# Patient Record
Sex: FEMALE | Race: WHITE | NOT HISPANIC OR LATINO | Employment: FULL TIME | URBAN - METROPOLITAN AREA
[De-identification: names, ages, dates, MRNs, and addresses within clinical notes are randomized per-mention and may not be internally consistent; named-entity substitution may affect disease eponyms.]

---

## 2023-05-18 NOTE — PROGRESS NOTES
Subjective   Ester Vogel is a 67 y.o. female who presents for follow-up.  HPI  Ester 67 with a known history of hypothyroid hypertension dyslipidemia here for follow-up fasting blood work voices no major medical pain denies chest pain shortness of breath fever chills nausea vomiting constipation diarrhea dysuria urgency frequency.  Review of Systems  10 system reviewed pertinent as above  Objective     Visit Vitals  /84   Pulse 72   Temp 36.1 °C (97 °F)   Resp 14      Physical Exam    HEENT: Atraumatic normocephalic the pupils are equal and round and reactive to light the sclerae nonicteric extraocular motion are intact.  Neck: Is supple without JVD no carotid bruits the trachea is midline there are no masses pulses are equal and bilateral with normal upstroke.  Skin: Normal.  Skin good texture.  Moist.  Good turgor.  No lesions, no rashes.  Lymph: No lymphadenopathy appreciated, no masses, no lesions  Lungs: Are clear to auscultation and percussion, good breath sounds bilaterally, no rhonchi, no wheezing, good diaphragmatic excursion.  Heart: Normal rate and normal rhythm S1, S2, no S3, no gallop, murmur or rub.  Abdomen: Soft, nontender, no organomegaly, good bowel sounds.    Extremities: Full range of motion, good pulses bilateral.  No cyanosis, no clubbing or edema.  Neuro: Cranial nerves II-XII are grossly intact there is no sensory or motor deficits.  Able to move all extremities.    Assessment/Plan     Patient is here for follow-up fasting blood work    CBC BMP lipids AST ALT vitamin D 25-hydroxy TSH    Continue with the low-fat, low-cholesterol diet,  I recommended Mediterranean diet, which include fish, chicken, vegetables and olive oil  Exercise daily for 30 minutes at least 3 times a week  Continue home medications    Hypertension  No added salt diet, do not and salt to your food  Try to exercise every other day for 30 minutes  Continue current medications    Hypothyroidism  Continue current  medications  Report any changes such as weight gain or weight loss  Continue to exercise regularly  Problem List Items Addressed This Visit          Circulatory    Heart palpitations - Primary    Relevant Orders    Holter or Event Cardiac Monitor    Basic Metabolic Panel       Endocrine/Metabolic    Hypothyroidism    Relevant Orders    CBC    Basic Metabolic Panel    TSH    Vitamin D deficiency    Relevant Orders    Vitamin D 25-Hydroxy,Total       Other    Dyslipidemia    Relevant Orders    CBC    Lipid Panel    AST    ALT    Tiredness    Relevant Orders    CBC         Tan Manzo MD

## 2023-05-23 ENCOUNTER — OFFICE VISIT (OUTPATIENT)
Dept: PRIMARY CARE | Facility: CLINIC | Age: 68
End: 2023-05-23
Payer: COMMERCIAL

## 2023-05-23 VITALS
SYSTOLIC BLOOD PRESSURE: 134 MMHG | HEIGHT: 66 IN | TEMPERATURE: 97 F | RESPIRATION RATE: 14 BRPM | HEART RATE: 72 BPM | WEIGHT: 190 LBS | DIASTOLIC BLOOD PRESSURE: 84 MMHG | BODY MASS INDEX: 30.53 KG/M2

## 2023-05-23 DIAGNOSIS — E78.5 DYSLIPIDEMIA: ICD-10-CM

## 2023-05-23 DIAGNOSIS — E55.9 VITAMIN D DEFICIENCY: ICD-10-CM

## 2023-05-23 DIAGNOSIS — R53.83 TIREDNESS: ICD-10-CM

## 2023-05-23 DIAGNOSIS — E03.9 HYPOTHYROIDISM, UNSPECIFIED TYPE: ICD-10-CM

## 2023-05-23 DIAGNOSIS — R00.2 HEART PALPITATIONS: Primary | ICD-10-CM

## 2023-05-23 LAB
ALANINE AMINOTRANSFERASE (SGPT) (U/L) IN SER/PLAS: 15 U/L (ref 7–45)
ASPARTATE AMINOTRANSFERASE (SGOT) (U/L) IN SER/PLAS: 18 U/L (ref 9–39)

## 2023-05-23 PROCEDURE — 1159F MED LIST DOCD IN RCRD: CPT | Performed by: INTERNAL MEDICINE

## 2023-05-23 PROCEDURE — 84450 TRANSFERASE (AST) (SGOT): CPT

## 2023-05-23 PROCEDURE — 80061 LIPID PANEL: CPT | Performed by: INTERNAL MEDICINE

## 2023-05-23 PROCEDURE — 84460 ALANINE AMINO (ALT) (SGPT): CPT

## 2023-05-23 PROCEDURE — 80048 BASIC METABOLIC PNL TOTAL CA: CPT | Performed by: INTERNAL MEDICINE

## 2023-05-23 PROCEDURE — 36415 COLL VENOUS BLD VENIPUNCTURE: CPT

## 2023-05-23 PROCEDURE — 99214 OFFICE O/P EST MOD 30 MIN: CPT | Performed by: INTERNAL MEDICINE

## 2023-05-23 PROCEDURE — 1036F TOBACCO NON-USER: CPT | Performed by: INTERNAL MEDICINE

## 2023-05-23 PROCEDURE — 85025 COMPLETE CBC W/AUTO DIFF WBC: CPT | Performed by: INTERNAL MEDICINE

## 2023-05-23 PROCEDURE — 82306 VITAMIN D 25 HYDROXY: CPT | Performed by: INTERNAL MEDICINE

## 2023-05-23 PROCEDURE — 84443 ASSAY THYROID STIM HORMONE: CPT | Performed by: INTERNAL MEDICINE

## 2023-05-23 RX ORDER — ASPIRIN 325 MG
50000 TABLET, DELAYED RELEASE (ENTERIC COATED) ORAL
COMMUNITY
End: 2023-07-10

## 2023-05-23 RX ORDER — PROPRANOLOL HYDROCHLORIDE 20 MG/1
20 TABLET ORAL 2 TIMES DAILY
COMMUNITY
End: 2023-07-10

## 2023-05-23 RX ORDER — AZITHROMYCIN 250 MG/1
TABLET, FILM COATED ORAL
COMMUNITY
Start: 2023-04-26 | End: 2023-05-23 | Stop reason: ALTCHOICE

## 2023-05-23 RX ORDER — LEVOTHYROXINE SODIUM 75 UG/1
75 TABLET ORAL DAILY
COMMUNITY
End: 2023-07-10

## 2023-05-23 RX ORDER — SERTRALINE HYDROCHLORIDE 100 MG/1
100 TABLET, FILM COATED ORAL DAILY
COMMUNITY
End: 2024-04-18 | Stop reason: ALTCHOICE

## 2023-05-23 RX ORDER — ALBUTEROL SULFATE 90 UG/1
AEROSOL, METERED RESPIRATORY (INHALATION)
COMMUNITY
Start: 2023-04-26

## 2023-05-23 ASSESSMENT — COLUMBIA-SUICIDE SEVERITY RATING SCALE - C-SSRS
2. HAVE YOU ACTUALLY HAD ANY THOUGHTS OF KILLING YOURSELF?: NO
1. IN THE PAST MONTH, HAVE YOU WISHED YOU WERE DEAD OR WISHED YOU COULD GO TO SLEEP AND NOT WAKE UP?: NO
6. HAVE YOU EVER DONE ANYTHING, STARTED TO DO ANYTHING, OR PREPARED TO DO ANYTHING TO END YOUR LIFE?: NO

## 2023-05-23 ASSESSMENT — ENCOUNTER SYMPTOMS
DEPRESSION: 0
LOSS OF SENSATION IN FEET: 0
OCCASIONAL FEELINGS OF UNSTEADINESS: 0

## 2023-05-23 ASSESSMENT — PATIENT HEALTH QUESTIONNAIRE - PHQ9
2. FEELING DOWN, DEPRESSED OR HOPELESS: NOT AT ALL
SUM OF ALL RESPONSES TO PHQ9 QUESTIONS 1 AND 2: 0
1. LITTLE INTEREST OR PLEASURE IN DOING THINGS: NOT AT ALL

## 2023-05-23 ASSESSMENT — PAIN SCALES - GENERAL: PAINLEVEL: 0-NO PAIN

## 2023-05-25 ENCOUNTER — TELEPHONE (OUTPATIENT)
Dept: PRIMARY CARE | Facility: CLINIC | Age: 68
End: 2023-05-25
Payer: COMMERCIAL

## 2023-07-08 DIAGNOSIS — E55.9 VITAMIN D DEFICIENCY, UNSPECIFIED: ICD-10-CM

## 2023-07-09 DIAGNOSIS — I10 ESSENTIAL (PRIMARY) HYPERTENSION: ICD-10-CM

## 2023-07-09 DIAGNOSIS — E06.3 AUTOIMMUNE THYROIDITIS: ICD-10-CM

## 2023-07-10 RX ORDER — LEVOTHYROXINE SODIUM 75 UG/1
TABLET ORAL
Qty: 90 TABLET | Refills: 2 | Status: SHIPPED | OUTPATIENT
Start: 2023-07-10 | End: 2024-04-18 | Stop reason: SDUPTHER

## 2023-07-10 RX ORDER — PROPRANOLOL HYDROCHLORIDE 20 MG/1
TABLET ORAL
Qty: 180 TABLET | Refills: 1 | Status: SHIPPED | OUTPATIENT
Start: 2023-07-10 | End: 2024-03-04 | Stop reason: SDUPTHER

## 2023-07-10 RX ORDER — ASPIRIN 325 MG
TABLET, DELAYED RELEASE (ENTERIC COATED) ORAL
Qty: 12 CAPSULE | Refills: 1 | Status: SHIPPED | OUTPATIENT
Start: 2023-07-10 | End: 2024-01-19 | Stop reason: SDUPTHER

## 2023-07-31 ENCOUNTER — TELEMEDICINE (OUTPATIENT)
Dept: PRIMARY CARE | Facility: CLINIC | Age: 68
End: 2023-07-31
Payer: COMMERCIAL

## 2023-07-31 DIAGNOSIS — Z71.89 GRIEF COUNSELING: Primary | ICD-10-CM

## 2023-07-31 PROCEDURE — 99214 OFFICE O/P EST MOD 30 MIN: CPT | Performed by: INTERNAL MEDICINE

## 2023-07-31 NOTE — PROGRESS NOTES
Subjective   Ester Vogel is a 67 y.o. female who presents for virtual visit.  HPI  Ester is 67 with history of hypothyroid hypertension depressive mood disorder, patient called about her father was in the hospital with a stroke in the territory of the right MCA, he is now in bed with left-sided weakness expressive and receptive aphasia inability to swallow.  Daughter had questions regarding treatment, management, future plans.  Currently Mr. Parsons is resting comfortably in bed with eyes open without the ability to communicate, apparently with expressive possibly receptive aphasia.  Patient is accompanied by his granddaughter and daughter now considering option and management including but not limited to physical therapy Occupational Therapy speech therapy, palliative care, hospice care  Review of Systems  Pertinent as above  Objective   Virtual  There were no vitals taken for this visit.   Physical Exam  Virtual      Assessment/Plan     Patient's father with acute CVA  MCA territory  With left-sided weakness  Apparently with expressive aphasia receptive aphasia  Speech therapy, patient appears to not participate  Declines attempts to feed.  Physical therapy Occupational Therapy  There is no evidence of potential improvement at this time  After long discussion with patient's daughter power of   Patient granddaughter, pharmacist, it was decided that  Due to the severity of the stroke and patient's advanced age in the setting of dementia  A consideration for hospice care will be given per family at their discretion  I will be available for any help as needed.    Total time spent with patient is greater than 30 minutes, more than 50% of the time is spent in direct patient care.  Patient consultation and coordination of care.  Patient voiced a full understanding of all treatment plan.  All patients were answered to patient satisfaction.  Problem List Items Addressed This Visit    Lopez Maddox  MD Jovany

## 2023-09-11 PROBLEM — E66.811 OBESITY, CLASS I, BMI 30-34.9: Status: ACTIVE | Noted: 2023-07-14

## 2023-09-11 PROBLEM — D10.1 FIBROMA OF TONGUE: Status: ACTIVE | Noted: 2023-09-11

## 2023-09-11 PROBLEM — K21.9 GERD (GASTROESOPHAGEAL REFLUX DISEASE): Status: ACTIVE | Noted: 2023-09-11

## 2023-09-11 PROBLEM — J30.2 SEASONAL ALLERGIES: Status: ACTIVE | Noted: 2023-09-11

## 2023-09-11 PROBLEM — I10 HTN (HYPERTENSION): Status: ACTIVE | Noted: 2023-09-11

## 2023-09-11 PROBLEM — F41.8 OTHER SPECIFIED ANXIETY DISORDERS: Status: ACTIVE | Noted: 2023-09-11

## 2023-09-11 PROBLEM — E04.1 NONTOXIC SINGLE THYROID NODULE: Status: ACTIVE | Noted: 2022-08-10

## 2023-09-11 PROBLEM — E04.1 THYROID NODULE: Status: ACTIVE | Noted: 2023-09-11

## 2023-09-11 PROBLEM — E66.9 OBESITY, CLASS I, BMI 30-34.9: Status: ACTIVE | Noted: 2023-07-14

## 2023-09-11 PROBLEM — E03.8 HYPOTHYROIDISM DUE TO HASHIMOTO'S THYROIDITIS: Status: ACTIVE | Noted: 2022-08-10

## 2023-09-11 PROBLEM — H81.10 BENIGN PAROXYSMAL POSITIONAL VERTIGO: Status: ACTIVE | Noted: 2023-09-11

## 2023-09-11 PROBLEM — M67.432 GANGLION OF LEFT WRIST: Status: ACTIVE | Noted: 2023-09-11

## 2023-09-11 PROBLEM — N95.1 MENOPAUSAL SYMPTOM: Status: ACTIVE | Noted: 2023-09-11

## 2023-09-11 PROBLEM — H93.291 OTHER ABNORMAL AUDITORY PERCEPTIONS, RIGHT EAR: Status: ACTIVE | Noted: 2023-09-11

## 2023-09-11 PROBLEM — H93.13 TINNITUS OF BOTH EARS: Status: ACTIVE | Noted: 2023-09-11

## 2023-09-11 PROBLEM — E06.3 HYPOTHYROIDISM DUE TO HASHIMOTO'S THYROIDITIS: Status: ACTIVE | Noted: 2022-08-10

## 2023-09-11 PROBLEM — M89.9 BONE DISORDER: Status: ACTIVE | Noted: 2023-09-11

## 2023-09-11 PROBLEM — B02.23 SHINGLES (HERPES ZOSTER) POLYNEUROPATHY: Status: ACTIVE | Noted: 2023-09-11

## 2023-09-11 RX ORDER — CALCIUM CARBONATE 500(1250)
TABLET ORAL
COMMUNITY

## 2023-09-11 RX ORDER — ERGOCALCIFEROL 1.25 MG/1
CAPSULE ORAL
COMMUNITY
End: 2024-04-18 | Stop reason: SDUPTHER

## 2023-09-11 RX ORDER — SODIUM CHLORIDE 0.9 % (FLUSH) 0.9 %
SYRINGE (ML) INJECTION
COMMUNITY
Start: 2023-07-14 | End: 2024-10-12

## 2023-09-15 ENCOUNTER — APPOINTMENT (OUTPATIENT)
Dept: PRIMARY CARE | Facility: CLINIC | Age: 68
End: 2023-09-15
Payer: COMMERCIAL

## 2023-09-28 ENCOUNTER — APPOINTMENT (OUTPATIENT)
Dept: PRIMARY CARE | Facility: CLINIC | Age: 68
End: 2023-09-28
Payer: COMMERCIAL

## 2023-10-04 ENCOUNTER — APPOINTMENT (OUTPATIENT)
Dept: PRIMARY CARE | Facility: CLINIC | Age: 68
End: 2023-10-04
Payer: COMMERCIAL

## 2024-01-09 ENCOUNTER — OFFICE VISIT (OUTPATIENT)
Dept: OTOLARYNGOLOGY | Facility: CLINIC | Age: 69
End: 2024-01-09
Payer: COMMERCIAL

## 2024-01-09 ENCOUNTER — CLINICAL SUPPORT (OUTPATIENT)
Dept: AUDIOLOGY | Facility: CLINIC | Age: 69
End: 2024-01-09
Payer: COMMERCIAL

## 2024-01-09 VITALS — HEIGHT: 66 IN | BODY MASS INDEX: 31.34 KG/M2 | WEIGHT: 195 LBS

## 2024-01-09 DIAGNOSIS — E06.3 HASHIMOTO'S THYROIDITIS: ICD-10-CM

## 2024-01-09 DIAGNOSIS — E04.2 MULTINODULAR GOITER: ICD-10-CM

## 2024-01-09 DIAGNOSIS — H93.13 TINNITUS OF BOTH EARS: Primary | ICD-10-CM

## 2024-01-09 PROCEDURE — 92557 COMPREHENSIVE HEARING TEST: CPT | Performed by: AUDIOLOGIST

## 2024-01-09 PROCEDURE — 1126F AMNT PAIN NOTED NONE PRSNT: CPT | Performed by: OTOLARYNGOLOGY

## 2024-01-09 PROCEDURE — 1036F TOBACCO NON-USER: CPT | Performed by: OTOLARYNGOLOGY

## 2024-01-09 PROCEDURE — 92550 TYMPANOMETRY & REFLEX THRESH: CPT | Performed by: AUDIOLOGIST

## 2024-01-09 PROCEDURE — 1159F MED LIST DOCD IN RCRD: CPT | Performed by: OTOLARYNGOLOGY

## 2024-01-09 PROCEDURE — 99214 OFFICE O/P EST MOD 30 MIN: CPT | Performed by: OTOLARYNGOLOGY

## 2024-01-09 NOTE — PROGRESS NOTES
Chief Complaint   Patient presents with    Follow-up     FUV - Bilateral ear ringing- hearing test       Date of Evaluation: 2024   SELVIN Vogel is a 68 y.o. female with a history of Hashimoto's thyroiditis and multinodular goiter and bilateral tinnitus.  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  She has been aware of nighttime tinnitus.  Her audiogram today is normal.  No headache or dizziness.  Her last ultrasound was 2023 and she had a normal TSH in May 2023.  No changes in her neck.       Past Medical History:   Diagnosis Date    Abnormal levels of other serum enzymes 2015    Elevated liver enzymes    Cervicalgia 2014    Neck pain    Personal history of diseases of the blood and blood-forming organs and certain disorders involving the immune mechanism     History of autoimmune disorder    Personal history of other diseases of the circulatory system     History of hypertension    Personal history of other specified conditions 2015    History of fatigue      Past Surgical History:   Procedure Laterality Date     SECTION, CLASSIC      OTHER SURGICAL HISTORY  2022    Laparoscopy    OTHER SURGICAL HISTORY  06/15/2021     section          Medications:   Current Outpatient Medications   Medication Instructions    albuterol 90 mcg/actuation inhaler     calcium carbonate (Oscal) 500 mg calcium (1,250 mg) tablet 1 tablet with meals Orally Twice a day for 30 day(s)    cholecalciferol (Vitamin D-3) 1,250 mcg (50,000 unit) capsule TAKE 1 CAPSULE BY MOUTH ONE TIME PER WEEK    ergocalciferol (Vitamin D-2) 1.25 MG (16207 UT) capsule 1 capsule Orally ONCE A WEEK    levothyroxine (Synthroid, Levoxyl) 75 mcg tablet TAKE 1 TABLET BY MOUTH EVERY DAY    MULTIVITAMIN ORAL as directed Orally    PERFLUTREN LIPID MICROSPHERES IV perflutren lipid microspheres 1.3 mL in NaCl (PF) 0.9% 10 mL injection (DEFINITY)<BR>    propranolol (Inderal) 20 mg  "tablet TAKE 1 TABLET BY MOUTH TWICE A DAY    sertraline (ZOLOFT) 100 mg, oral, Daily    sodium chloride 0.9% (sodium chloride) flush         Allergies:  No Known Allergies     Physical Exam:  Last Recorded Vitals  Height 1.676 m (5' 6\"), weight 88.5 kg (195 lb).  []General appearance: Well-developed, well-nourished in no acute distress, conversant with normal voice quality    Head/face: No erythema or edema or facial tenderness, and normal facial nerve function bilaterally    External ear: Clear external auditory canals with normal pinnae  Tube status: N/A  Middle ear: Tympanic membranes intact and mobile, middle ears normal.  Tympanic membrane perforation: N/A  Mastoid bowl: N/A  Hearing: Normal conversational awareness at normal speech thresholds    Nose visualized using: Anterior rhinoscopy  Nasal dorsum: Nontraumatic midline appearance  Septum: Midline, nonobstructing  Inferior turbinates: Normal, pink  Secretions: Dry    Oral cavity and oropharynx: Normal  Teeth: Good condition  Floor of mouth: without lesions  Palate: Normal hard palate, soft palate and uvula  Oropharynx: Clear, no lesions present  Buccal mucosa: Normal without masses or lesions  Lips: Normal    Nasopharynx: Inadequate mirror exam secondary to gag/anatomy    Neck:  Salivary glands: Normal bilateral parotid and submandibular glands by inspection and palpation.  Non-thyroid masses: No palpable masses or significant lymphadenopathy  Trachea: Midline  Thyroid: No thyromegaly or palpable nodules  Temporomandibular joint: Nontender  Cervical range of motion: Normal    Neurologic exam: Alert and oriented x3, appropriate affect.  Cranial nerves II-XII normal bilaterally  Extraocular movement: Extraocular movement intact, normal gaze alignment        Ester was seen today for follow-up.  Diagnoses and all orders for this visit:  Tinnitus of both ears (Primary)  Hashimoto's thyroiditis  -     US thyroid; Future  -     Thyroid Stimulating Hormone; " Future  Multinodular goiter       PLAN  We have had a long discussion regarding the pathophysiology of tinnitus.  We discussed background noise and stress reduction.  Her hearing test is reassuring.  I have recommended follow-up ultrasound and TSH in May 2024.  She will call me for results and follow-up in 1 year    Arjun Carey MD

## 2024-01-09 NOTE — PROGRESS NOTES
AUDIOLOGY ADULT AUDIOMETRIC EVALUATION    Name:  Ester Vogel  :  1955  Age:  68 y.o.  Date of Evaluation:  2024    Reason for visit: Patient is seen in the clinic today at the request of Arjun Carey MD in otolaryngology for an audiologic evaluation.     HISTORY  The patient reported that she has been experiencing constant, bilateral tinnitus for years.  Otalgia, aural pressure, and dizziness were denied.  She is not sure if she has any hearing loss.    EVALUATION  See scanned audiogram: “Media” > “Audiology Report”.    RESULTS  Otoscopic Evaluation:  Right Ear: clear ear canal  Left Ear: clear ear canal    Immittance Measures:  Tympanometry:  Right Ear: Type A, normal tympanic membrane mobility with normal middle ear pressure   Left Ear: Type A, normal tympanic membrane mobility with normal middle ear pressure     Acoustic Reflexes:  Ipsilateral Right Ear: Absent at 500-4000 Hz  Ipsilateral Left Ear: present at normal levels at 500-4000 Hz   Contralateral Right Ear: did not evaluate  Contralateral Left Ear: did not evaluate    Distortion Product Otoacoustic Emissions (DPOAEs):  Right Ear: passed 6681-7695 Hz, absent at 5872-9501 Hz  Left Ear: passed 1847-9079 Hz, absent at 1000 and 8000 Hz    Audiometry:  Test Technique and Reliability:   Standard audiometry via supra-aural headphones. Reliability is good.    Pure tone air and bone conduction audiometry:  Right Ear: normal hearing  Left Ear: normal hearing    Speech Audiometry (Word Recognition Scores):   Right Ear: Excellent, 100%   Left Ear: Excellent, 100%     IMPRESSIONS  Results of today's audiometric evaluation revealed normal hearing sensitivity in both ears.  The presence of acoustic reflexes within normal intensity limits is consistent with normal middle ear and brainstem function, and suggests that auditory sensitivity is not significantly impaired. An elevated or absent acoustic reflex threshold is consistent with a middle ear  disorder, hearing loss in the stimulated ear, and/or interruption of neural innervation of the stapedius muscle. Present DPOAEs suggest normal/near normal cochlear outer hair cell function and are consistent with no greater than a mild hearing loss at those frequencies. Absent DPOAEs are consistent with abnormal cochlear outer hair cell function at those frequencies.    RECOMMENDATIONS  - Follow up with otolaryngology today as scheduled.  - Audiologic evaluation as needed.    PATIENT EDUCATION  Discussed results, impressions and recommendations with the patient. Questions were addressed and the patient was encouraged to contact our office should concerns arise.    Time for this encounter: 1:30-2:00

## 2024-01-19 DIAGNOSIS — E55.9 VITAMIN D DEFICIENCY, UNSPECIFIED: ICD-10-CM

## 2024-01-19 RX ORDER — ASPIRIN 325 MG
50000 TABLET, DELAYED RELEASE (ENTERIC COATED) ORAL
Qty: 12 CAPSULE | Refills: 1 | Status: SHIPPED | OUTPATIENT
Start: 2024-01-19

## 2024-03-04 DIAGNOSIS — I10 ESSENTIAL (PRIMARY) HYPERTENSION: ICD-10-CM

## 2024-03-04 RX ORDER — PROPRANOLOL HYDROCHLORIDE 20 MG/1
20 TABLET ORAL 2 TIMES DAILY
Qty: 180 TABLET | Refills: 1 | Status: SHIPPED | OUTPATIENT
Start: 2024-03-04 | End: 2024-08-31

## 2024-04-16 NOTE — PROGRESS NOTES
Subjective   Ester Vogel is a 68 y.o. female who presents for follow-up and fasting blood work.  HPI  Ester 68 with a known history of hypothyroid hypertension dyslipidemia patient is here for follow-up, takes medication prescribed without side effects to report denies chest pain shortness of breath fever chill nausea vomiting constipation diarrhea dysuria urgency frequency.    Review of Systems  10 system reviewed pertinent as above  Objective     Visit Vitals  /82   Pulse 78   Temp 36.6 °C (97.9 °F)   Resp 15        Physical Exam    HEENT: Atraumatic normocephalic the pupils are equal and round and reactive to light the sclerae nonicteric extraocular motion are intact.  Neck: Is supple without JVD no carotid bruits the trachea is midline there are no masses pulses are equal and bilateral with normal upstroke.  Skin: Normal.  Skin good texture.  Moist.  Good turgor.  No lesions, no rashes.  Lymph: No lymphadenopathy appreciated, no masses, no lesions  Lungs: Are clear to auscultation and percussion, good breath sounds bilaterally, no rhonchi, no wheezing, good diaphragmatic excursion.  Heart: Normal rate and normal rhythm S1, S2, no S3, no gallop, murmur or rub.  Abdomen: Soft, nontender, no organomegaly, good bowel sounds.    Extremities: Full range of motion, good pulses bilateral.  No cyanosis, no clubbing or edema.  Neuro: Cranial nerves II-XII are grossly intact there is no sensory or motor deficits.  Able to move all extremities.    Assessment/Plan     For follow-up fasting blood work    CBC BMP lipids AST ALT vitamin D 25-hydroxy TSH    Mammogram 02/2024    Follows dermatology Dr ruiz    Colonoscopy Dr Gumaro Bates    Immunization 2/2 1 booster  Flu Vaccine 2022 need 2024  Shingrix 2/2    Follows with Gyn Dr Carey    Continue with the low-fat, low-cholesterol diet,  I recommended Mediterranean diet, which include fish, chicken, vegetables and olive oil  Exercise daily for 30 minutes at  least 3 times a week  Continue home medications    Hypertension  No added salt diet, do not and salt to your food  Try to exercise every other day for 30 minutes  Continue current medications    Hypothyroidism  Continue current medications  Report any changes such as weight gain or weight loss  Continue to exercise regularly    Elevated BMI  Low-fat, low-cholesterol diet, exercise, daily  Ideal BMI is between 23 and 26 kg/m²  Low carbohydrate diet.  Problem List Items Addressed This Visit       Hypothyroidism    Relevant Orders    CBC    TSH    Dyslipidemia    Relevant Orders    Lipid Panel    AST    ALT    CT cardiac scoring wo IV contrast    Vitamin D insufficiency - Primary    Relevant Medications    ergocalciferol (Vitamin D-2) 1.25 MG (70346 UT) capsule (Start on 4/21/2024)    Other Relevant Orders    Vitamin D 25-Hydroxy,Total (for eval of Vitamin D levels)    Autoimmune thyroiditis    Relevant Medications    levothyroxine (Synthroid, Levoxyl) 75 mcg tablet    HTN (hypertension)    Relevant Orders    Basic Metabolic Panel    Seasonal allergies    Relevant Medications    fluticasone (Flonase) 50 mcg/actuation nasal spray    Other Relevant Orders    CBC    Depression    Relevant Medications    sertraline (Zoloft) 50 mg tablet           Tan Manzo MD

## 2024-04-18 ENCOUNTER — OFFICE VISIT (OUTPATIENT)
Dept: PRIMARY CARE | Facility: CLINIC | Age: 69
End: 2024-04-18
Payer: COMMERCIAL

## 2024-04-18 VITALS
DIASTOLIC BLOOD PRESSURE: 82 MMHG | SYSTOLIC BLOOD PRESSURE: 122 MMHG | BODY MASS INDEX: 31.18 KG/M2 | HEART RATE: 78 BPM | TEMPERATURE: 97.9 F | HEIGHT: 66 IN | RESPIRATION RATE: 15 BRPM | WEIGHT: 194 LBS

## 2024-04-18 DIAGNOSIS — J30.2 SEASONAL ALLERGIES: ICD-10-CM

## 2024-04-18 DIAGNOSIS — I10 PRIMARY HYPERTENSION: ICD-10-CM

## 2024-04-18 DIAGNOSIS — E78.5 DYSLIPIDEMIA: ICD-10-CM

## 2024-04-18 DIAGNOSIS — E55.9 VITAMIN D DEFICIENCY: ICD-10-CM

## 2024-04-18 DIAGNOSIS — E55.9 VITAMIN D INSUFFICIENCY: Primary | ICD-10-CM

## 2024-04-18 DIAGNOSIS — F32.A DEPRESSION, UNSPECIFIED DEPRESSION TYPE: ICD-10-CM

## 2024-04-18 DIAGNOSIS — E06.3 AUTOIMMUNE THYROIDITIS: ICD-10-CM

## 2024-04-18 DIAGNOSIS — E03.9 HYPOTHYROIDISM, UNSPECIFIED TYPE: ICD-10-CM

## 2024-04-18 LAB
ALT SERPL W P-5'-P-CCNC: 19 U/L (ref 7–45)
ANION GAP SERPL CALC-SCNC: 12 MMOL/L (ref 10–20)
AST SERPL W P-5'-P-CCNC: 20 U/L (ref 9–39)
BUN SERPL-MCNC: 11 MG/DL (ref 6–23)
CALCIUM SERPL-MCNC: 9.8 MG/DL (ref 8.6–10.6)
CHLORIDE SERPL-SCNC: 104 MMOL/L (ref 98–107)
CHOLEST SERPL-MCNC: 182 MG/DL (ref 0–199)
CHOLESTEROL/HDL RATIO: 3
CO2 SERPL-SCNC: 29 MMOL/L (ref 21–32)
CREAT SERPL-MCNC: 0.77 MG/DL (ref 0.5–1.05)
EGFRCR SERPLBLD CKD-EPI 2021: 85 ML/MIN/1.73M*2
ERYTHROCYTE [DISTWIDTH] IN BLOOD BY AUTOMATED COUNT: 13.5 % (ref 11.5–14.5)
GLUCOSE SERPL-MCNC: 94 MG/DL (ref 74–99)
HCT VFR BLD AUTO: 39.1 % (ref 36–46)
HDLC SERPL-MCNC: 60.4 MG/DL
HGB BLD-MCNC: 13 G/DL (ref 12–16)
LDLC SERPL CALC-MCNC: 95 MG/DL
MCH RBC QN AUTO: 30.3 PG (ref 26–34)
MCHC RBC AUTO-ENTMCNC: 33.2 G/DL (ref 32–36)
MCV RBC AUTO: 91 FL (ref 80–100)
NON HDL CHOLESTEROL: 122 MG/DL (ref 0–149)
NRBC BLD-RTO: 0 /100 WBCS (ref 0–0)
PLATELET # BLD AUTO: 266 X10*3/UL (ref 150–450)
POTASSIUM SERPL-SCNC: 4 MMOL/L (ref 3.5–5.3)
RBC # BLD AUTO: 4.29 X10*6/UL (ref 4–5.2)
SODIUM SERPL-SCNC: 141 MMOL/L (ref 136–145)
TRIGL SERPL-MCNC: 132 MG/DL (ref 0–149)
VLDL: 26 MG/DL (ref 0–40)
WBC # BLD AUTO: 5.2 X10*3/UL (ref 4.4–11.3)

## 2024-04-18 PROCEDURE — 1159F MED LIST DOCD IN RCRD: CPT | Performed by: INTERNAL MEDICINE

## 2024-04-18 PROCEDURE — 1126F AMNT PAIN NOTED NONE PRSNT: CPT | Performed by: INTERNAL MEDICINE

## 2024-04-18 PROCEDURE — 84460 ALANINE AMINO (ALT) (SGPT): CPT

## 2024-04-18 PROCEDURE — 84443 ASSAY THYROID STIM HORMONE: CPT

## 2024-04-18 PROCEDURE — 82306 VITAMIN D 25 HYDROXY: CPT

## 2024-04-18 PROCEDURE — 85027 COMPLETE CBC AUTOMATED: CPT

## 2024-04-18 PROCEDURE — 36415 COLL VENOUS BLD VENIPUNCTURE: CPT

## 2024-04-18 PROCEDURE — 84450 TRANSFERASE (AST) (SGOT): CPT

## 2024-04-18 PROCEDURE — 3074F SYST BP LT 130 MM HG: CPT | Performed by: INTERNAL MEDICINE

## 2024-04-18 PROCEDURE — 1036F TOBACCO NON-USER: CPT | Performed by: INTERNAL MEDICINE

## 2024-04-18 PROCEDURE — 3079F DIAST BP 80-89 MM HG: CPT | Performed by: INTERNAL MEDICINE

## 2024-04-18 PROCEDURE — 99214 OFFICE O/P EST MOD 30 MIN: CPT | Performed by: INTERNAL MEDICINE

## 2024-04-18 PROCEDURE — 80061 LIPID PANEL: CPT

## 2024-04-18 PROCEDURE — 80048 BASIC METABOLIC PNL TOTAL CA: CPT

## 2024-04-18 RX ORDER — LEVOTHYROXINE SODIUM 75 UG/1
75 TABLET ORAL DAILY
Qty: 90 TABLET | Refills: 3 | Status: SHIPPED | OUTPATIENT
Start: 2024-04-18 | End: 2025-04-18

## 2024-04-18 RX ORDER — FLUTICASONE PROPIONATE 50 MCG
1 SPRAY, SUSPENSION (ML) NASAL DAILY
Qty: 16 G | Refills: 5 | Status: SHIPPED | OUTPATIENT
Start: 2024-04-18 | End: 2025-04-18

## 2024-04-18 RX ORDER — ERGOCALCIFEROL 1.25 MG/1
50000 CAPSULE ORAL
Qty: 12 CAPSULE | Refills: 3 | Status: SHIPPED | OUTPATIENT
Start: 2024-04-21 | End: 2025-04-21

## 2024-04-18 RX ORDER — SERTRALINE HYDROCHLORIDE 50 MG/1
50 TABLET, FILM COATED ORAL DAILY
Qty: 30 TABLET | Refills: 5 | Status: SHIPPED | OUTPATIENT
Start: 2024-04-18 | End: 2024-10-15

## 2024-04-18 ASSESSMENT — ENCOUNTER SYMPTOMS
OCCASIONAL FEELINGS OF UNSTEADINESS: 0
LOSS OF SENSATION IN FEET: 0
DEPRESSION: 0

## 2024-04-18 ASSESSMENT — PAIN SCALES - GENERAL: PAINLEVEL: 0-NO PAIN

## 2024-04-20 LAB
25(OH)D3 SERPL-MCNC: 95 NG/ML (ref 30–100)
TSH SERPL-ACNC: 4.4 MIU/L (ref 0.44–3.98)

## 2024-06-23 ASSESSMENT — ENCOUNTER SYMPTOMS
SHORTNESS OF BREATH: 0
FATIGUE: 0
HEADACHES: 0
ABDOMINAL PAIN: 0
DIZZINESS: 0
DIFFICULTY URINATING: 0
CHEST TIGHTNESS: 0
ABDOMINAL DISTENTION: 0
DYSURIA: 0
COLOR CHANGE: 0
WEAKNESS: 0
ADENOPATHY: 0
JOINT SWELLING: 0
ACTIVITY CHANGE: 0
UNEXPECTED WEIGHT CHANGE: 0

## 2024-06-23 NOTE — PROGRESS NOTES
"Annual-menopause  Subjective   Ester Vogel is a 68 y.o. former pt Dr. Carey/last seen 23, who is here for a menopausal gyn exam.   Complaints:  denies vag bleed or discharge; denies pelvic  pain, pressure, or persistent bloating.   PMHx:  BMI 32; Nodule  thyroid 2022.  Surg Hx: C - SECTION ;  laparoscopy ; LEEP   Father:  age 97 yrs, high chol,dementia,htn, stroke  Mother:  age 79; diabetes  Last pap  2023 neg; 2020- neg; neg hpv   Abn pap  LEEP 2009= HPV changes/mild dysplasia.   Mamm 2024 neg [facility]  DEXA 2023: spine 0.4 /hip 0.5/ fem neck 0.4 ; rpt due after   Colon cancer screen=scope 24 pos polyp; rpt due   Menarche 15. LMP =age 57. No hrt.   not currently sexually active. Updated 24  Total preg 1: ; FTP, NUCHAL CORD X 4.    Review of Systems   Constitutional:  Negative for activity change, fatigue and unexpected weight change.   Respiratory:  Negative for chest tightness and shortness of breath.    Cardiovascular:  Negative for chest pain and leg swelling.   Gastrointestinal:  Negative for abdominal distention and abdominal pain.   Genitourinary:  Negative for difficulty urinating, dysuria, genital sores, pelvic pain, vaginal bleeding, vaginal discharge and vaginal pain.   Musculoskeletal:  Negative for gait problem and joint swelling.   Skin:  Negative for color change and rash.   Neurological:  Negative for dizziness, weakness and headaches.   Hematological:  Negative for adenopathy.   Objective Visit Vitals  /72   Ht 1.676 m (5' 6\")   Wt 90 kg (198 lb 6.4 oz)   BMI 32.02 kg/m²   OB Status Postmenopausal   Smoking Status Former   BSA 2.05 m²       General:   Alert and oriented, in no acute distress   Neck: Supple. No visible thyromegaly.    Breast/Axilla: Normal to palpation bilaterally without masses, skin changes, or nipple discharge.    Abdomen: Soft, non-tender, without masses or organomegaly "   Vulva: Normal architecture without erythema, masses, or lesions.    Vagina: mucosa without lesions, masses.  Positive atrophy. No abnormal vaginal discharge.    Cervix: Normal without masses, lesions, or signs of cervicitis.    Uterus:  Grossly normal mobile, non-enlarged uterus ; exam limited by bmi   Adnexa: No palpable masses or tenderness; exam limited by bmi   Pelvic Floor No POP noted. No high tone pelvic floor    Psych  Rectal Normal affect. Normal mood.      Assessment/Plan   Encounter Diagnoses   Name Primary?    Menopause; grossly normal breast and GYN exams Yes    Encounter for screening mammogram for malignant neoplasm of breast; sees breast specialist at [facility] every 6 months; next mammogram due 2/25     Postmenopausal atrophic vaginitis; mild symptoms; treatment options reviewed.     Screen for colon cancer; scope completed 5/24; repeat due 2029     Postmenopausal bone loss; DEXA completed and neg 5/2023; Repeat due 2025.    History of tobacco use; confers risk for accelerated bone loss as well as cardiovascular and pulmonary disease.       Kim Peter MD

## 2024-06-25 ENCOUNTER — OFFICE VISIT (OUTPATIENT)
Dept: OBSTETRICS AND GYNECOLOGY | Facility: CLINIC | Age: 69
End: 2024-06-25
Payer: COMMERCIAL

## 2024-06-25 VITALS
DIASTOLIC BLOOD PRESSURE: 72 MMHG | BODY MASS INDEX: 31.88 KG/M2 | SYSTOLIC BLOOD PRESSURE: 126 MMHG | WEIGHT: 198.4 LBS | HEIGHT: 66 IN

## 2024-06-25 DIAGNOSIS — M81.0 POSTMENOPAUSAL BONE LOSS: ICD-10-CM

## 2024-06-25 DIAGNOSIS — Z87.891 HISTORY OF TOBACCO USE: ICD-10-CM

## 2024-06-25 DIAGNOSIS — N95.2 POSTMENOPAUSAL ATROPHIC VAGINITIS: ICD-10-CM

## 2024-06-25 DIAGNOSIS — Z78.0 MENOPAUSE: Primary | ICD-10-CM

## 2024-06-25 DIAGNOSIS — Z12.31 ENCOUNTER FOR SCREENING MAMMOGRAM FOR MALIGNANT NEOPLASM OF BREAST: ICD-10-CM

## 2024-06-25 DIAGNOSIS — Z12.11 SCREEN FOR COLON CANCER: ICD-10-CM

## 2024-06-25 PROCEDURE — 3078F DIAST BP <80 MM HG: CPT | Performed by: OBSTETRICS & GYNECOLOGY

## 2024-06-25 PROCEDURE — 99214 OFFICE O/P EST MOD 30 MIN: CPT | Performed by: OBSTETRICS & GYNECOLOGY

## 2024-06-25 PROCEDURE — 1159F MED LIST DOCD IN RCRD: CPT | Performed by: OBSTETRICS & GYNECOLOGY

## 2024-06-25 PROCEDURE — 1160F RVW MEDS BY RX/DR IN RCRD: CPT | Performed by: OBSTETRICS & GYNECOLOGY

## 2024-06-25 PROCEDURE — 1126F AMNT PAIN NOTED NONE PRSNT: CPT | Performed by: OBSTETRICS & GYNECOLOGY

## 2024-06-25 PROCEDURE — 1036F TOBACCO NON-USER: CPT | Performed by: OBSTETRICS & GYNECOLOGY

## 2024-06-25 PROCEDURE — 3074F SYST BP LT 130 MM HG: CPT | Performed by: OBSTETRICS & GYNECOLOGY

## 2024-06-25 ASSESSMENT — PATIENT HEALTH QUESTIONNAIRE - PHQ9
1. LITTLE INTEREST OR PLEASURE IN DOING THINGS: NOT AT ALL
SUM OF ALL RESPONSES TO PHQ9 QUESTIONS 1 & 2: 0
2. FEELING DOWN, DEPRESSED OR HOPELESS: NOT AT ALL

## 2024-06-25 ASSESSMENT — LIFESTYLE VARIABLES
SKIP TO QUESTIONS 9-10: 1
HOW OFTEN DO YOU HAVE SIX OR MORE DRINKS ON ONE OCCASION: NEVER
HOW OFTEN DO YOU HAVE A DRINK CONTAINING ALCOHOL: 2-4 TIMES A MONTH
AUDIT-C TOTAL SCORE: 2
HOW MANY STANDARD DRINKS CONTAINING ALCOHOL DO YOU HAVE ON A TYPICAL DAY: 1 OR 2

## 2024-06-25 ASSESSMENT — ENCOUNTER SYMPTOMS
LOSS OF SENSATION IN FEET: 0
DEPRESSION: 0
OCCASIONAL FEELINGS OF UNSTEADINESS: 0

## 2024-06-25 ASSESSMENT — PAIN SCALES - GENERAL: PAINLEVEL: 0-NO PAIN

## 2024-08-02 ENCOUNTER — HOSPITAL ENCOUNTER (OUTPATIENT)
Dept: RADIOLOGY | Facility: HOSPITAL | Age: 69
Discharge: HOME | End: 2024-08-02
Payer: COMMERCIAL

## 2024-08-02 DIAGNOSIS — E78.5 DYSLIPIDEMIA: ICD-10-CM

## 2024-08-02 PROCEDURE — 75571 CT HRT W/O DYE W/CA TEST: CPT

## 2024-08-18 DIAGNOSIS — I10 ESSENTIAL (PRIMARY) HYPERTENSION: ICD-10-CM

## 2024-08-19 RX ORDER — PROPRANOLOL HYDROCHLORIDE 20 MG/1
20 TABLET ORAL 2 TIMES DAILY
Qty: 180 TABLET | Refills: 1 | Status: SHIPPED | OUTPATIENT
Start: 2024-08-19

## 2024-10-18 ENCOUNTER — APPOINTMENT (OUTPATIENT)
Dept: PRIMARY CARE | Facility: CLINIC | Age: 69
End: 2024-10-18
Payer: COMMERCIAL

## 2024-11-17 NOTE — PROGRESS NOTES
Subjective   Ester Vogel is a 69 y.o. female who presents for follow-up and fasting blood work.  HPI  Ester 69 with a known history of hypothyroid hypertension dyslipidemia patient is here for follow-up, takes medication prescribed without side effects to report denies chest pain shortness of breath fever chill nausea vomiting constipation diarrhea dysuria urgency frequency. Take your medication blood work done at [facility] 10/2024  reviewed.   Review of Systems  10 system reviewed pertinent as above  Objective     Visit Vitals  /80   Pulse 78   Temp 36.5 °C (97.7 °F) (Temporal)   Resp 14          Physical Exam    HEENT: Atraumatic normocephalic the pupils are equal and round and reactive to light the sclerae nonicteric extraocular motion are intact.  Neck: Is supple without JVD no carotid bruits the trachea is midline there are no masses pulses are equal and bilateral with normal upstroke.  Skin: Normal.  Skin good texture.  Moist.  Good turgor.  No lesions, no rashes.  Lymph: No lymphadenopathy appreciated, no masses, no lesions  Lungs: Are clear to auscultation and percussion, good breath sounds bilaterally, no rhonchi, no wheezing, good diaphragmatic excursion.  Heart: Normal rate and normal rhythm S1, S2, no S3, no gallop, murmur or rub.  Abdomen: Soft, nontender, no organomegaly, good bowel sounds.    Extremities: Full range of motion, good pulses bilateral.  No cyanosis, no clubbing or edema.  Neuro: Cranial nerves II-XII are grossly intact there is no sensory or motor deficits.  Able to move all extremities.    Assessment/Plan     For follow-up fasting blood work  Done 10/2024 at [facility]  We reviewed essentially normal  Takes her medication as prescribed.     Mammogram 02/2024 yearly    Follows dermatology Dr ruiz    Colonoscopy Dr Gumaro Bates 05/17/2024 repeat in 5 years    Immunization 2/2 1 booster  Flu Vaccine 2022 need 2024  Shingrix 2/2    Follows with Gyn  Dr Carey Retired  Cont to follow regularly    Continue with the low-fat, low-cholesterol diet,  I recommended Mediterranean diet, which include fish, chicken, vegetables and olive oil  Exercise daily for 30 minutes at least 3 times a week  Continue home medications    Hypertension well controlled  No added salt diet, do not and salt to your food  Try to exercise every other day for 30 minutes  Continue current medications    Hypothyroidism  Continue current medications  Report any changes such as weight gain or weight loss  Continue to exercise regularly    Elevated BMI  Low-fat, low-cholesterol diet, exercise, daily  Ideal BMI is between 23 and 26 kg/m²  Low carbohydrate diet.    obesity  Problem List Items Addressed This Visit       Pulmonary fibrosis, unspecified (Multi) - Primary             Tan Manzo MD

## 2024-11-22 ENCOUNTER — APPOINTMENT (OUTPATIENT)
Dept: PRIMARY CARE | Facility: CLINIC | Age: 69
End: 2024-11-22
Payer: COMMERCIAL

## 2024-11-22 VITALS
BODY MASS INDEX: 30.86 KG/M2 | SYSTOLIC BLOOD PRESSURE: 120 MMHG | DIASTOLIC BLOOD PRESSURE: 80 MMHG | HEART RATE: 78 BPM | TEMPERATURE: 97.7 F | WEIGHT: 192 LBS | HEIGHT: 66 IN | RESPIRATION RATE: 14 BRPM

## 2024-11-22 DIAGNOSIS — J84.10 PULMONARY FIBROSIS, UNSPECIFIED (MULTI): Primary | ICD-10-CM

## 2024-11-22 ASSESSMENT — ENCOUNTER SYMPTOMS
DEPRESSION: 0
LOSS OF SENSATION IN FEET: 0
OCCASIONAL FEELINGS OF UNSTEADINESS: 0

## 2024-11-22 ASSESSMENT — PAIN SCALES - GENERAL: PAINLEVEL_OUTOF10: 2

## 2024-12-12 DIAGNOSIS — E55.9 VITAMIN D DEFICIENCY, UNSPECIFIED: ICD-10-CM

## 2024-12-13 RX ORDER — ASPIRIN 325 MG
50000 TABLET, DELAYED RELEASE (ENTERIC COATED) ORAL
Qty: 12 CAPSULE | Refills: 1 | Status: SHIPPED | OUTPATIENT
Start: 2024-12-15

## 2025-03-26 NOTE — PROGRESS NOTES
Subjective   Ester Vogel is a 69 y.o. female who presents for follow-up complaining of lower back pain   HPI  Ester 69 with a known history of hypothyroid hypertension dyslipidemia patient is here for follow-up, takes medication prescribed without side effects to report denies chest pain shortness of breath fever chill nausea vomiting constipation diarrhea dysuria urgency frequency.  Patient complaining of back pain radiating to the buttock area take your medication blood work done at [employer] 10/2024  reviewed. Lower back pain rt radiating to rt leg improving from leg pain now able to walk with pain it was very well.   Review of Systems  10 system reviewed pertinent as above  Objective     Visit Vitals  /84   Pulse 78   Resp 15        Physical Exam    HEENT: Atraumatic normocephalic the pupils are equal and round and reactive to light the sclerae nonicteric extraocular motion are intact.  Neck: Is supple without JVD no carotid bruits the trachea is midline there are no masses pulses are equal and bilateral with normal upstroke.  Skin: Normal.  Skin good texture.  Moist.  Good turgor.  No lesions, no rashes.  Lymph: No lymphadenopathy appreciated, no masses, no lesions  Lungs: Are clear to auscultation and percussion, good breath sounds bilaterally, no rhonchi, no wheezing, good diaphragmatic excursion.  Heart: Normal rate and normal rhythm S1, S2, no S3, no gallop, murmur or rub.  Abdomen: Soft, nontender, no organomegaly, good bowel sounds.    Extremities: Full range of motion, good pulses bilateral.  No cyanosis, no clubbing or edema.  Neuro: Cranial nerves II-XII are grossly intact there is no sensory or motor deficits.  Able to move all extremities.    Assessment/Plan     Complaining of back pain  Acute sciatica  Lower back pain  Medrol dose pack  Xray lower back   Fluids and rest core exercise    We spoke of weight loss   Diet and exercise    For follow-up fasting blood work  Done  10/2024 at [employer]  We reviewed essentially normal  Takes her medication as prescribed.     Mammogram 02/2024 yearly    Follows dermatology Dr ruiz    Colonoscopy Dr Gumaro Bates 05/17/2024 repeat in 5 years    Immunization 2/2 1 booster  Flu Vaccine 2022 need 2024  Shingrix 2/2    Follows with Gyn Dr Carey Retired  Cont to follow regularly    Continue with the low-fat, low-cholesterol diet,  I recommended Mediterranean diet, which include fish, chicken, vegetables and olive oil  Exercise daily for 30 minutes at least 3 times a week  Continue home medications    Hypertension well controlled  No added salt diet, do not and salt to your food  Try to exercise every other day for 30 minutes  Continue current medications    Hypothyroidism  Continue current medications  Report any changes such as weight gain or weight loss  Continue to exercise regularly    Elevated BMI  Low-fat, low-cholesterol diet, exercise, daily  Ideal BMI is between 23 and 26 kg/m²  Low carbohydrate diet.    obesity  Problem List Items Addressed This Visit    None  Visit Diagnoses       Acute sciatica    -  Primary    Relevant Medications    tiZANidine (Zanaflex) 2 mg tablet    methylPREDNISolone (Medrol Dospak) 4 mg tablets    meloxicam (Mobic) 7.5 mg tablet                    Tan Manzo MD

## 2025-03-28 ENCOUNTER — HOSPITAL ENCOUNTER (OUTPATIENT)
Dept: RADIOLOGY | Facility: CLINIC | Age: 70
Discharge: HOME | End: 2025-03-28
Payer: MEDICARE

## 2025-03-28 ENCOUNTER — OFFICE VISIT (OUTPATIENT)
Dept: PRIMARY CARE | Facility: CLINIC | Age: 70
End: 2025-03-28
Payer: MEDICARE

## 2025-03-28 VITALS
BODY MASS INDEX: 31.82 KG/M2 | HEIGHT: 66 IN | HEART RATE: 78 BPM | DIASTOLIC BLOOD PRESSURE: 84 MMHG | SYSTOLIC BLOOD PRESSURE: 122 MMHG | RESPIRATION RATE: 15 BRPM | WEIGHT: 198 LBS

## 2025-03-28 DIAGNOSIS — E78.5 DYSLIPIDEMIA: ICD-10-CM

## 2025-03-28 DIAGNOSIS — K21.9 GASTROESOPHAGEAL REFLUX DISEASE WITHOUT ESOPHAGITIS: ICD-10-CM

## 2025-03-28 DIAGNOSIS — E03.9 HYPOTHYROIDISM, UNSPECIFIED TYPE: ICD-10-CM

## 2025-03-28 DIAGNOSIS — H81.10 BENIGN PAROXYSMAL POSITIONAL VERTIGO, UNSPECIFIED LATERALITY: ICD-10-CM

## 2025-03-28 DIAGNOSIS — M54.30 ACUTE SCIATICA: ICD-10-CM

## 2025-03-28 DIAGNOSIS — M54.30 ACUTE SCIATICA: Primary | ICD-10-CM

## 2025-03-28 PROCEDURE — 72110 X-RAY EXAM L-2 SPINE 4/>VWS: CPT

## 2025-03-28 PROCEDURE — 3079F DIAST BP 80-89 MM HG: CPT | Performed by: INTERNAL MEDICINE

## 2025-03-28 PROCEDURE — 1159F MED LIST DOCD IN RCRD: CPT | Performed by: INTERNAL MEDICINE

## 2025-03-28 PROCEDURE — 1125F AMNT PAIN NOTED PAIN PRSNT: CPT | Performed by: INTERNAL MEDICINE

## 2025-03-28 PROCEDURE — 1158F ADVNC CARE PLAN TLK DOCD: CPT | Performed by: INTERNAL MEDICINE

## 2025-03-28 PROCEDURE — 99214 OFFICE O/P EST MOD 30 MIN: CPT | Performed by: INTERNAL MEDICINE

## 2025-03-28 PROCEDURE — 1036F TOBACCO NON-USER: CPT | Performed by: INTERNAL MEDICINE

## 2025-03-28 PROCEDURE — 1123F ACP DISCUSS/DSCN MKR DOCD: CPT | Performed by: INTERNAL MEDICINE

## 2025-03-28 PROCEDURE — 3008F BODY MASS INDEX DOCD: CPT | Performed by: INTERNAL MEDICINE

## 2025-03-28 PROCEDURE — 3074F SYST BP LT 130 MM HG: CPT | Performed by: INTERNAL MEDICINE

## 2025-03-28 RX ORDER — MELOXICAM 7.5 MG/1
7.5 TABLET ORAL DAILY
Qty: 30 TABLET | Refills: 1 | Status: SHIPPED | OUTPATIENT
Start: 2025-03-28 | End: 2026-03-28

## 2025-03-28 RX ORDER — TIZANIDINE 2 MG/1
2 TABLET ORAL NIGHTLY
Qty: 10 TABLET | Refills: 0 | Status: SHIPPED | OUTPATIENT
Start: 2025-03-28 | End: 2025-04-07

## 2025-03-28 RX ORDER — METHYLPREDNISOLONE 4 MG/1
TABLET ORAL
Qty: 21 TABLET | Refills: 0 | Status: SHIPPED | OUTPATIENT
Start: 2025-03-28 | End: 2025-04-03

## 2025-03-28 ASSESSMENT — ENCOUNTER SYMPTOMS
DEPRESSION: 0
LOSS OF SENSATION IN FEET: 0
OCCASIONAL FEELINGS OF UNSTEADINESS: 0

## 2025-03-28 ASSESSMENT — PAIN SCALES - GENERAL: PAINLEVEL_OUTOF10: 4

## 2025-04-22 ENCOUNTER — NURSE TRIAGE (OUTPATIENT)
Dept: AUDIOLOGY | Facility: CLINIC | Age: 70
End: 2025-04-22
Payer: MEDICARE

## 2025-04-22 ENCOUNTER — TELEMEDICINE (OUTPATIENT)
Dept: PRIMARY CARE | Facility: CLINIC | Age: 70
End: 2025-04-22
Payer: MEDICARE

## 2025-04-22 DIAGNOSIS — S06.0X0A CONCUSSION WITHOUT LOSS OF CONSCIOUSNESS, INITIAL ENCOUNTER: ICD-10-CM

## 2025-04-22 DIAGNOSIS — G44.309 POST-CONCUSSION HEADACHE: ICD-10-CM

## 2025-04-22 DIAGNOSIS — R42 DIZZINESS: Primary | ICD-10-CM

## 2025-04-22 PROCEDURE — 99213 OFFICE O/P EST LOW 20 MIN: CPT | Performed by: INTERNAL MEDICINE

## 2025-04-22 NOTE — PROGRESS NOTES
Subjective   Ester Vogel is a 69 y.o. female who presents for virtual follow-up    HPI  Ester Espinosa with a known history of hypothyroid hypertension dyslipidemia patient is here for follow-up, takes medication prescribed patient complaining of a headache, and fogginess, she recalls a cramp that woke her up at about 4 AM on Monday morning patient claims she instinctively sat quickly and then went back down slammed her head onto the pillow and her head and neck crack, subsequently patient became dizzy.  She is now about 36 hours after the event she is feeling somewhat better but still dizzy especially moving her head from right to left.  She denies fever chills she denies nausea vomiting.  Review of Systems  10 system reviewed pertinent as above  Objective   Virtual visit  There were no vitals taken for this visit.       Physical Exam    Virtual visit  Assessment/Plan     Postconcussive headache  Without loss of consciousness  With dizziness and fogginess  Occurred about 4 AM on April 21, 2025  She is now about 36 after after the event  She is feeling somewhat better but still foggy  She is well-articulated she is got good facial symmetry  She has good thought processes and good vocabulary  She denies nausea or vomiting  I believe she has postconcussive headache  Should improve over period of time  She is taking meloxicam for lower back pain  With some improvement  There is no additional medication at this time that is required  Patient is instructed to go to the emergency room at once  Should his symptoms change should she develop a headache  Or any concerns patient voiced full understanding of all instructions  All questions were answered patient satisfaction    Complaining of back pain  Acute sciatica  Lower back pain  Medrol dose pack  Xray lower back   Fluids and rest core exercise    We spoke of weight loss   Diet and exercise    For follow-up fasting blood work  Done 10/2024 at [facility]  We  reviewed essentially normal  Takes her medication as prescribed.     Mammogram 02/2024 yearly    Follows dermatology Dr ruiz    Colonoscopy Dr Gumaro Bates 05/17/2024 repeat in 5 years    Immunization 2/2 1 booster  Flu Vaccine 2022 need 2024  Shingrix 2/2    Follows with Gyn Dr Carey Retired  Cont to follow regularly    Continue with the low-fat, low-cholesterol diet,  I recommended Mediterranean diet, which include fish, chicken, vegetables and olive oil  Exercise daily for 30 minutes at least 3 times a week  Continue home medications    Hypertension well controlled  No added salt diet, do not and salt to your food  Try to exercise every other day for 30 minutes  Continue current medications    Hypothyroidism  Continue current medications  Report any changes such as weight gain or weight loss  Continue to exercise regularly    Elevated BMI  Low-fat, low-cholesterol diet, exercise, daily  Ideal BMI is between 23 and 26 kg/m²  Low carbohydrate diet.    obesity  Problem List Items Addressed This Visit    None                Tan Manzo MD

## 2025-04-22 NOTE — TELEPHONE ENCOUNTER
[facility] Call notes copied by Casie Tobin on Tuesday April 22, 2025  8:47 AM  ------  Documentation by Casie Tobin. [[id number]] 4/22/2025  8:41 AM  [phone] dr. Carey/audiologist bppv     Leg cramp in bed Monday morning sat up quickly and lay back hard on head and heard huge crack in neck; headache nausea; positional dizziness (feels the same as last time).    Does not think anything else involved; muscles in neck tighter (doesn't want to straighten); ringing in both ears (has been going on for a while). Jaw tightness/pain as a result of this episode.

## 2025-04-25 ENCOUNTER — CLINICAL SUPPORT (OUTPATIENT)
Dept: AUDIOLOGY | Facility: CLINIC | Age: 70
End: 2025-04-25
Payer: MEDICARE

## 2025-04-25 DIAGNOSIS — R42 DIZZINESS: Primary | ICD-10-CM

## 2025-04-25 DIAGNOSIS — S06.0X0S CONCUSSION WITHOUT LOSS OF CONSCIOUSNESS, SEQUELA: ICD-10-CM

## 2025-04-25 PROCEDURE — 92542 POSITIONAL NYSTAGMUS TEST: CPT | Performed by: AUDIOLOGIST
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
## 2025-04-28 ENCOUNTER — APPOINTMENT (OUTPATIENT)
Dept: RADIOLOGY | Facility: HOSPITAL | Age: 70
End: 2025-04-28
Payer: MEDICARE

## 2025-04-28 ENCOUNTER — APPOINTMENT (OUTPATIENT)
Dept: RADIOLOGY | Facility: CLINIC | Age: 70
End: 2025-04-28
Payer: MEDICARE

## 2025-04-28 DIAGNOSIS — S06.0X0S CONCUSSION WITHOUT LOSS OF CONSCIOUSNESS, SEQUELA: ICD-10-CM

## 2025-04-28 PROCEDURE — 70450 CT HEAD/BRAIN W/O DYE: CPT | Performed by: RADIOLOGY

## 2025-04-28 PROCEDURE — 70450 CT HEAD/BRAIN W/O DYE: CPT

## 2025-04-30 NOTE — PROGRESS NOTES
Subjective   Ester Vogel is a 69 y.o. female who presents for follow-up 6 months follow-up blood works  HPI  Ester 69 with a known history of hypothyroid hypertension dyslipidemia patient is here for follow-up, takes medication prescribed without side effects to report denies chest pain shortness of breath fever chill nausea vomiting constipation diarrhea dysuria urgency frequency.  Recently treated for lower back pain.  Here for fasting blood work and follow-up. Patient is complaining of dizziness  sp hitting the back of her head on her pillow CT head Negative, still co neck pain  Review of Systems  10 system reviewed pertinent as above  Objective     Visit Vitals  /82   Pulse 78   Resp 16          Physical Exam    HEENT: Atraumatic normocephalic the pupils are equal and round and reactive to light the sclerae nonicteric extraocular motion are intact.  Neck: Is supple without JVD no carotid bruits the trachea is midline there are no masses pulses are equal and bilateral with normal upstroke.  Skin: Normal.  Skin good texture.  Moist.  Good turgor.  No lesions, no rashes.  Lymph: No lymphadenopathy appreciated, no masses, no lesions  Lungs: Are clear to auscultation and percussion, good breath sounds bilaterally, no rhonchi, no wheezing, good diaphragmatic excursion.  Heart: Normal rate and normal rhythm S1, S2, no S3, no gallop, murmur or rub.  Abdomen: Soft, nontender, no organomegaly, good bowel sounds.    Extremities: Full range of motion, good pulses bilateral.  No cyanosis, no clubbing or edema.  Neuro: Cranial nerves II-XII are grossly intact there is no sensory or motor deficits.  Able to move all extremities.    Assessment/Plan     Fasting blood work    CBC BMP lipids AST LT vitamin 25-hydroxy    For follow-up fasting blood work  Done 10/2024 at [facility]  We reviewed essentially normal  Takes her medication as prescribed.   Negative CT Head    Paraspinal muscle pain  Due to  concussion  Xray C Spine  Medrol dose pac, Antivert    Mammogram 02/2024 yearly    Follows dermatology Dr ruiz    Colonoscopy Dr Gumaro Bates 05/17/2024 repeat in 5 years    Immunization 2/2 1 booster  Flu Vaccine 2022 need 2024  Shingrix 2/2    Follows with Gyn Dr Carey Retired  Cont to follow regularly    Continue with the low-fat, low-cholesterol diet,  I recommended Mediterranean diet, which include fish, chicken, vegetables and olive oil  Exercise daily for 30 minutes at least 3 times a week  Continue home medications    Hypertension well controlled  No added salt diet, do not and salt to your food  Try to exercise every other day for 30 minutes  Continue current medications    Hypothyroidism  Continue current medications  Report any changes such as weight gain or weight loss  Continue to exercise regularly    Elevated BMI  Low-fat, low-cholesterol diet, exercise, daily  Ideal BMI is between 23 and 26 kg/m²  Low carbohydrate diet.    obesity  Problem List Items Addressed This Visit       Cervicalgia - Primary    Relevant Medications    methylPREDNISolone (Medrol Dospak) 4 mg tablets    Other Relevant Orders    XR cervical spine 2-3 views    Pulmonary fibrosis, unspecified (Multi)    Dizziness    Relevant Medications    meclizine (Antivert) 25 mg tablet     Other Visit Diagnoses         Nausea        Relevant Medications    ondansetron ODT (Zofran-ODT) 4 mg disintegrating tablet                      Tan Manzo MD

## 2025-05-02 ENCOUNTER — HOSPITAL ENCOUNTER (OUTPATIENT)
Dept: RADIOLOGY | Facility: CLINIC | Age: 70
Discharge: HOME | End: 2025-05-02
Payer: MEDICARE

## 2025-05-02 ENCOUNTER — OFFICE VISIT (OUTPATIENT)
Dept: OTOLARYNGOLOGY | Facility: CLINIC | Age: 70
End: 2025-05-02
Payer: MEDICARE

## 2025-05-02 ENCOUNTER — APPOINTMENT (OUTPATIENT)
Dept: PRIMARY CARE | Facility: CLINIC | Age: 70
End: 2025-05-02
Payer: COMMERCIAL

## 2025-05-02 ENCOUNTER — CLINICAL SUPPORT (OUTPATIENT)
Dept: AUDIOLOGY | Facility: CLINIC | Age: 70
End: 2025-05-02
Payer: MEDICARE

## 2025-05-02 VITALS
SYSTOLIC BLOOD PRESSURE: 122 MMHG | HEIGHT: 66 IN | WEIGHT: 190 LBS | BODY MASS INDEX: 30.53 KG/M2 | HEART RATE: 78 BPM | RESPIRATION RATE: 16 BRPM | DIASTOLIC BLOOD PRESSURE: 82 MMHG

## 2025-05-02 VITALS — HEIGHT: 66 IN | WEIGHT: 190 LBS | BODY MASS INDEX: 30.53 KG/M2

## 2025-05-02 DIAGNOSIS — M54.2 CERVICALGIA: ICD-10-CM

## 2025-05-02 DIAGNOSIS — E03.9 HYPOTHYROIDISM, UNSPECIFIED TYPE: ICD-10-CM

## 2025-05-02 DIAGNOSIS — E55.9 VITAMIN D INSUFFICIENCY: ICD-10-CM

## 2025-05-02 DIAGNOSIS — S06.0X0D CONCUSSION WITHOUT LOSS OF CONSCIOUSNESS, SUBSEQUENT ENCOUNTER: ICD-10-CM

## 2025-05-02 DIAGNOSIS — Z01.10 ENCOUNTER FOR HEARING EXAMINATION WITHOUT ABNORMAL FINDINGS: ICD-10-CM

## 2025-05-02 DIAGNOSIS — E04.2 MULTINODULAR GOITER: ICD-10-CM

## 2025-05-02 DIAGNOSIS — R42 DIZZINESS: ICD-10-CM

## 2025-05-02 DIAGNOSIS — R42 DIZZINESS AND GIDDINESS: ICD-10-CM

## 2025-05-02 DIAGNOSIS — E66.811 OBESITY, CLASS I, BMI 30-34.9: ICD-10-CM

## 2025-05-02 DIAGNOSIS — E06.3 HASHIMOTO'S THYROIDITIS: ICD-10-CM

## 2025-05-02 DIAGNOSIS — H93.13 TINNITUS OF BOTH EARS: Primary | ICD-10-CM

## 2025-05-02 DIAGNOSIS — K21.9 GASTROESOPHAGEAL REFLUX DISEASE WITHOUT ESOPHAGITIS: ICD-10-CM

## 2025-05-02 DIAGNOSIS — E78.5 DYSLIPIDEMIA: ICD-10-CM

## 2025-05-02 DIAGNOSIS — R11.0 NAUSEA: ICD-10-CM

## 2025-05-02 DIAGNOSIS — I10 PRIMARY HYPERTENSION: ICD-10-CM

## 2025-05-02 DIAGNOSIS — M54.2 CERVICALGIA: Primary | ICD-10-CM

## 2025-05-02 DIAGNOSIS — R00.2 HEART PALPITATIONS: ICD-10-CM

## 2025-05-02 DIAGNOSIS — R42 DIZZINESS: Primary | ICD-10-CM

## 2025-05-02 LAB
25(OH)D3 SERPL-MCNC: 114 NG/ML (ref 30–100)
ALT SERPL W P-5'-P-CCNC: 26 U/L (ref 16–63)
ANION GAP SERPL CALC-SCNC: 16 MMOL/L (ref 10–20)
AST SERPL W P-5'-P-CCNC: 18 U/L (ref 15–37)
BASOPHILS # BLD AUTO: 0.03 X10*3/UL (ref 0.1–1.6)
BASOPHILS NFR BLD AUTO: 0.39 % (ref 0–0.3)
BUN SERPL-MCNC: 11 MG/DL (ref 7–18)
CALCIUM SERPL-MCNC: 9.5 MG/DL (ref 8.5–10.1)
CHLORIDE SERPL-SCNC: 104 MMOL/L (ref 98–107)
CHOLEST SERPL-MCNC: 196 MG/DL (ref 0–199)
CHOLESTEROL/HDL RATIO: 3.2 (ref 4.2–7)
CO2 SERPL-SCNC: 28 MMOL/L (ref 21–32)
CREAT SERPL-MCNC: 0.83 MG/DL (ref 0.6–1.1)
EGFRCR SERPLBLD CKD-EPI 2021: 77 ML/MIN/1.73M*2
EOSINOPHIL # BLD AUTO: 0.14 X10*3/UL (ref 0.04–0.5)
EOSINOPHIL NFR BLD AUTO: 2.03 % (ref 0.7–7)
ERYTHROCYTE [DISTWIDTH] IN BLOOD BY AUTOMATED COUNT: 13.8 % (ref 11.5–14.5)
GLUCOSE SERPL-MCNC: 93 MG/DL (ref 74–100)
HCT VFR BLD AUTO: 42.5 % (ref 36.6–46.6)
HDLC SERPL-MCNC: 62 MG/DL (ref 40–59)
HGB BLD-MCNC: 14.8 G/DL (ref 12–15.4)
IS PATIENT FASTING: YES
LDLC SERPL DIRECT ASSAY-MCNC: 109 MG/DL (ref 0–100)
LYMPHOCYTES # BLD AUTO: 1.67 X10*3/UL (ref 0–6)
LYMPHOCYTES NFR BLD AUTO: 23.71 % (ref 20.5–51.1)
MCH RBC QN AUTO: 30.1 PG (ref 26–32)
MCHC RBC AUTO-ENTMCNC: 34.8 G/DL (ref 31–38)
MCV RBC AUTO: 86.3 FL (ref 80–96)
MONOCYTES # BLD AUTO: 0.41 X10*3/UL (ref 1.6–24.9)
MONOCYTES NFR BLD AUTO: 5.84 % (ref 1.7–9.3)
NEUTROPHILS # BLD AUTO: 4.79 X10*3/UL (ref 1.4–6.5)
NEUTROPHILS NFR BLD AUTO: 68.03 % (ref 42.2–75.2)
PLATELET # BLD AUTO: 280 X10*3/UL (ref 150–450)
PMV BLD AUTO: 7.44 FL (ref 7.8–11)
POTASSIUM SERPL-SCNC: 4.9 MMOL/L (ref 3.5–5.1)
RBC # BLD AUTO: 4.92 X10*6/UL (ref 3.9–5.3)
SODIUM SERPL-SCNC: 143 MMOL/L (ref 136–145)
TRIGL SERPL-MCNC: 101 MG/DL
TSH SERPL-ACNC: 3.11 MIU/L (ref 0.44–3.98)
WBC # BLD AUTO: 7.04 X10*3/UL (ref 4.5–10.5)

## 2025-05-02 PROCEDURE — 72050 X-RAY EXAM NECK SPINE 4/5VWS: CPT

## 2025-05-02 PROCEDURE — 99213 OFFICE O/P EST LOW 20 MIN: CPT

## 2025-05-02 PROCEDURE — 99214 OFFICE O/P EST MOD 30 MIN: CPT | Performed by: INTERNAL MEDICINE

## 2025-05-02 PROCEDURE — 3008F BODY MASS INDEX DOCD: CPT

## 2025-05-02 PROCEDURE — 1036F TOBACCO NON-USER: CPT | Performed by: INTERNAL MEDICINE

## 2025-05-02 PROCEDURE — 92550 TYMPANOMETRY & REFLEX THRESH: CPT | Performed by: AUDIOLOGIST

## 2025-05-02 PROCEDURE — 3074F SYST BP LT 130 MM HG: CPT | Performed by: INTERNAL MEDICINE

## 2025-05-02 PROCEDURE — G2211 COMPLEX E/M VISIT ADD ON: HCPCS | Performed by: INTERNAL MEDICINE

## 2025-05-02 PROCEDURE — 1159F MED LIST DOCD IN RCRD: CPT

## 2025-05-02 PROCEDURE — 1123F ACP DISCUSS/DSCN MKR DOCD: CPT | Performed by: INTERNAL MEDICINE

## 2025-05-02 PROCEDURE — 1159F MED LIST DOCD IN RCRD: CPT | Performed by: INTERNAL MEDICINE

## 2025-05-02 PROCEDURE — 1158F ADVNC CARE PLAN TLK DOCD: CPT | Performed by: INTERNAL MEDICINE

## 2025-05-02 PROCEDURE — 3008F BODY MASS INDEX DOCD: CPT | Performed by: INTERNAL MEDICINE

## 2025-05-02 PROCEDURE — 1160F RVW MEDS BY RX/DR IN RCRD: CPT | Performed by: INTERNAL MEDICINE

## 2025-05-02 PROCEDURE — 92557 COMPREHENSIVE HEARING TEST: CPT | Performed by: AUDIOLOGIST

## 2025-05-02 PROCEDURE — 1125F AMNT PAIN NOTED PAIN PRSNT: CPT | Performed by: INTERNAL MEDICINE

## 2025-05-02 PROCEDURE — 83721 ASSAY OF BLOOD LIPOPROTEIN: CPT | Performed by: INTERNAL MEDICINE

## 2025-05-02 PROCEDURE — 3079F DIAST BP 80-89 MM HG: CPT | Performed by: INTERNAL MEDICINE

## 2025-05-02 PROCEDURE — 85025 COMPLETE CBC W/AUTO DIFF WBC: CPT | Performed by: INTERNAL MEDICINE

## 2025-05-02 RX ORDER — ONDANSETRON 4 MG/1
4 TABLET, ORALLY DISINTEGRATING ORAL EVERY 8 HOURS PRN
Qty: 20 TABLET | Refills: 0 | Status: SHIPPED | OUTPATIENT
Start: 2025-05-02 | End: 2025-05-09

## 2025-05-02 RX ORDER — MECLIZINE HYDROCHLORIDE 25 MG/1
25 TABLET ORAL 3 TIMES DAILY PRN
Qty: 30 TABLET | Refills: 11 | Status: SHIPPED | OUTPATIENT
Start: 2025-05-02 | End: 2026-05-02

## 2025-05-02 RX ORDER — METHYLPREDNISOLONE 4 MG/1
TABLET ORAL
Qty: 21 TABLET | Refills: 0 | Status: SHIPPED | OUTPATIENT
Start: 2025-05-02 | End: 2025-05-08

## 2025-05-02 ASSESSMENT — PAIN SCALES - GENERAL: PAINLEVEL_OUTOF10: 2

## 2025-05-02 ASSESSMENT — ENCOUNTER SYMPTOMS
OCCASIONAL FEELINGS OF UNSTEADINESS: 0
LOSS OF SENSATION IN FEET: 0
DEPRESSION: 0

## 2025-05-02 NOTE — PROGRESS NOTES
Chief Complaint   Patient presents with    Dizziness     LOV: 2024 WITH MILLIE, BPPV/DIZZINESS SAW DEON ON , HAD HEAD CT SCAN DONE.  H/O TINNITUS & THYROID CHECK, HAD US/TSH DONE      Date of Evaluation: 2025   HPI  Ester Vogel is a 69 y.o. female with a history of Hashimoto's thyroiditis and multinodular goiter and bilateral tinnitus.  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  She has been aware of nighttime tinnitus.  Her audiogram today is normal.  No headache or dizziness.  Her last ultrasound was 2023 and she had a normal TSH in May 2023.  No changes in her neck.       Past Medical History:   Diagnosis Date    Abnormal levels of other serum enzymes 2015    Elevated liver enzymes    Abnormal Pap smear of cervix In chart    Cervicalgia 2014    Neck pain    Disease of thyroid gland 2022    nodule found on thyroid    HPV (human papilloma virus) infection In chart    Hypothyroidism In chart    Personal history of diseases of the blood and blood-forming organs and certain disorders involving the immune mechanism     History of autoimmune disorder    Personal history of other diseases of the circulatory system     History of hypertension    Personal history of other specified conditions 2015    History of fatigue      Past Surgical History:   Procedure Laterality Date    CERVICAL BIOPSY  W/ LOOP ELECTRODE EXCISION       SECTION, CLASSIC       SECTION, LOW TRANSVERSE  1991    OTHER SURGICAL HISTORY  2022    Laparoscopy          Medications:   Current Outpatient Medications   Medication Instructions    albuterol 90 mcg/actuation inhaler     calcium carbonate (Oscal) 500 mg calcium (1,250 mg) tablet 1 tablet with meals Orally Twice a day for 30 day(s)    cholecalciferol (VITAMIN D-3) 50,000 Units, oral, Once Weekly    fluticasone (Flonase) 50 mcg/actuation nasal spray 1 spray, Each Nostril, Daily, Shake  gently. Before first use, prime pump. After use, clean tip and replace cap.    levothyroxine (SYNTHROID, LEVOXYL) 75 mcg, oral, Daily    meclizine (ANTIVERT) 25 mg, oral, 3 times daily PRN    meloxicam (MOBIC) 7.5 mg, oral, Daily    methylPREDNISolone (Medrol Dospak) 4 mg tablets Follow schedule on package instructions    MULTIVITAMIN ORAL as directed Orally    ondansetron ODT (ZOFRAN-ODT) 4 mg, oral, Every 8 hours PRN    propranolol (INDERAL) 20 mg, oral, 2 times daily        Allergies:  No Known Allergies     Physical Exam:  Last Recorded Vitals  There were no vitals taken for this visit.  []General appearance: Well-developed, well-nourished in no acute distress, conversant with normal voice quality    Head/face: No erythema or edema or facial tenderness, and normal facial nerve function bilaterally    External ear: Clear external auditory canals with normal pinnae  Tube status: N/A  Middle ear: Tympanic membranes intact and mobile, middle ears normal.  Tympanic membrane perforation: N/A  Mastoid bowl: N/A  Hearing: Normal conversational awareness at normal speech thresholds    Nose visualized using: Anterior rhinoscopy  Nasal dorsum: Nontraumatic midline appearance  Septum: Midline, nonobstructing  Inferior turbinates: Normal, pink  Secretions: Dry    Oral cavity and oropharynx: Normal  Teeth: Good condition  Floor of mouth: without lesions  Palate: Normal hard palate, soft palate and uvula  Oropharynx: Clear, no lesions present  Buccal mucosa: Normal without masses or lesions  Lips: Normal    Nasopharynx: Inadequate mirror exam secondary to gag/anatomy    Neck:  Salivary glands: Normal bilateral parotid and submandibular glands by inspection and palpation.  Non-thyroid masses: No palpable masses or significant lymphadenopathy  Trachea: Midline  Thyroid: No thyromegaly or palpable nodules  Temporomandibular joint: Nontender  Cervical range of motion: Normal    Neurologic exam: Alert and oriented x3, appropriate  affect.  Cranial nerves II-XII normal bilaterally  Extraocular movement: Extraocular movement intact, normal gaze alignment        There are no diagnoses linked to this encounter.       PLAN  We have had a long discussion regarding the pathophysiology of tinnitus.  We discussed background noise and stress reduction.  Her hearing test is reassuring.  I have recommended follow-up ultrasound and TSH in May 2024.  She will call me for results and follow-up in 1 year    Vandana Hook, NAIMA-CNP

## 2025-05-02 NOTE — PROGRESS NOTES
AUDIOLOGY  AUDIOMETRIC EVALUATION    Name:  Ester Vogel  :  1955  Age:  69 y.o.  Date of Evaluation:  May 2, 2025    Reason for visit: Ms. Vogel is seen in the clinic today at the request of ANSHU Mcfadden in otolaryngology for an audiologic evaluation. Patient complains of dizziness and tinnitus. Most recent audiologic evaluation performed on 2024 revealed normal hearing sensitivity bilaterally.    AUDIOLOGIC EVALUATION  See scanned audiogram: “Media” > “Audiology Report” > Document ID [id number].    Objective   Otoscopic Evaluation  Right Ear: clear ear canal with visualization of the tympanic membrane  Left Ear: clear ear canal with visualization of the tympanic membrane    Immittance Measures  Tympanometry:  Right Ear: Type As, reduced tympanic membrane mobility with normal middle ear pressure  Left Ear: Type As, reduced tympanic membrane mobility with normal middle ear pressure    Acoustic Reflexes:  Ipsilateral Right Ear: absent from 500 Hz to 4000 Hz  Ipsilateral Left Ear: absent from 500 Hz to 4000 Hz  Contralateral Right Ear: did not evaluate  Contralateral Left Ear: did not evaluate    Distortion Product Otoacoustic Emissions (DPOAEs)  Right Ear: present from 1500 Hz to 3000 Hz, absent at 1000 Hz and from 4000 Hz to 8000 Hz  Left Ear: present from 1000 Hz to 6000 Hz, absent at 8000 Hz    Audiometry  Test Technique and Reliability:   Standard audiometry via supra-aural headphones. Pulsed tone stimulus. Reliability is good.    Pure tone air and bone conduction audiometry:  Right Ear: normal hearing sensitivity  Left Ear: normal hearing sensitivity    Speech Audiometry:  Speech Reception Threshold (SRT) Right Ear: 10 dB HL   Speech Reception Threshold (SRT) Left Ear: 10 dB HL   Word Recognition Score (WRS) Right Ear: Excellent, 100% at 50 dB HL   Word Recognition Score (WRS) Left Ear: Excellent, 100% at 50 dB HL      IMPRESSIONS  Audiometric evaluation revealed normal hearing  sensitivity bilaterally. Tympanometry indicates reduced tympanic membrane mobility (Type As) bilaterally. The absence of acoustic reflexes is consistent with a middle ear disorder, hearing loss in the stimulated ear, and/or interruption of neural innervation of the stapedius muscle. Present Distortion Product Otoacoustic Emissions (DPOAEs) suggest normal/near normal cochlear outer hair cell function and are consistent with no greater than a mild hearing loss at those frequencies. Results are essentially stable compared with 01/09/2024 evaluation.    RECOMMENDATIONS  - Follow up with otolaryngology today as scheduled.  - Audiologic evaluation as needed.  - Continue use of hearing protective devices when in loud, impact, and/or excessive noise.  - Follow-up with medical care team as planned.    PATIENT EDUCATION  Discussed results, impressions and recommendations with the patient. Questions were addressed and the patient was encouraged to contact our office should concerns arise.    Time for this encounter: 9542-0066    Mago Flores, CCC-A  Licensed Audiologist

## 2025-05-12 NOTE — PROGRESS NOTES
Subjective   Ester Vogel is a 69 y.o. female who presents for follow-up 6 months follow-up blood works  HPI  Ester Espinosa with a known history of hypothyroid hypertension dyslipidemia patient is here for follow-up, takes medication prescribed without side effects to report denies chest pain shortness of breath fever chill nausea vomiting constipation diarrhea dysuria urgency frequency.  Recently treated for lower back pain.  Here for fasting blood work and follow-up. Patient is complaining of dizziness  sp hitting the back of her head on her pillow CT head Negative, still co neck pain but improving  Review of Systems  10 system reviewed pertinent as above  Objective     Visit Vitals  /82   Pulse 74   Temp 36.6 °C (97.9 °F) (Temporal)   Resp 16        Physical Exam    HEENT: Atraumatic normocephalic the pupils are equal and round and reactive to light the sclerae nonicteric extraocular motion are intact.  Neck: Is supple without JVD no carotid bruits the trachea is midline there are no masses pulses are equal and bilateral with normal upstroke.  Skin: Normal.  Skin good texture.  Moist.  Good turgor.  No lesions, no rashes.  Lymph: No lymphadenopathy appreciated, no masses, no lesions  Lungs: Are clear to auscultation and percussion, good breath sounds bilaterally, no rhonchi, no wheezing, good diaphragmatic excursion.  Heart: Normal rate and normal rhythm S1, S2, no S3, no gallop, murmur or rub.  Abdomen: Soft, nontender, no organomegaly, good bowel sounds.    Extremities: Full range of motion, good pulses bilateral.  No cyanosis, no clubbing or edema.  Neuro: Cranial nerves II-XII are grossly intact there is no sensory or motor deficits.  Able to move all extremities.    Assessment/Plan     Doing better from post concussive head trauma  Reviewed cervical Xray DJD  ROM Stretching    If vertigo cont will refer to PT    Reviewed Blood work with patient    For follow-up fasting blood work  Done 10/2024 at  [facility]  We reviewed essentially normal  Takes her medication as prescribed.   Negative CT Head    Paraspinal muscle pain  Due to concussion  Xray C Spine  Medrol dose pac, Antivert    Mammogram 02/2024 yearly    Follows dermatology Dr ruiz    Colonoscopy Dr Gumaro Bates 05/17/2024 repeat in 5 years    Immunization 2/2 1 booster  Flu Vaccine 2022 need 2024  Shingrix 2/2    Follows with Gyn Dr Carey Retired  Cont to follow regularly    Continue with the low-fat, low-cholesterol diet,  I recommended Mediterranean diet, which include fish, chicken, vegetables and olive oil  Exercise daily for 30 minutes at least 3 times a week  Continue home medications    Hypertension well controlled  No added salt diet, do not and salt to your food  Try to exercise every other day for 30 minutes  Continue current medications    Hypothyroidism  Continue current medications  Report any changes such as weight gain or weight loss  Continue to exercise regularly    Elevated BMI  Low-fat, low-cholesterol diet, exercise, daily  Ideal BMI is between 23 and 26 kg/m²  Low carbohydrate diet.    obesity  Problem List Items Addressed This Visit    None  Visit Diagnoses         Leg cramps    -  Primary    Relevant Medications    magnesium oxide (Mag-Ox) 400 mg (241.3 mg elemental) tablet      Essential (primary) hypertension        Relevant Medications    propranolol (Inderal) 20 mg tablet                        Tan Manzo MD

## 2025-05-13 ENCOUNTER — APPOINTMENT (OUTPATIENT)
Dept: OTOLARYNGOLOGY | Facility: CLINIC | Age: 70
End: 2025-05-13
Payer: MEDICARE

## 2025-05-13 VITALS — WEIGHT: 190 LBS | TEMPERATURE: 98.1 F | HEIGHT: 66 IN | BODY MASS INDEX: 30.53 KG/M2

## 2025-05-13 DIAGNOSIS — M54.2 CERVICALGIA: ICD-10-CM

## 2025-05-13 DIAGNOSIS — R42 DIZZINESS AND GIDDINESS: Primary | ICD-10-CM

## 2025-05-13 PROCEDURE — 3008F BODY MASS INDEX DOCD: CPT

## 2025-05-13 PROCEDURE — 1159F MED LIST DOCD IN RCRD: CPT

## 2025-05-13 PROCEDURE — 99213 OFFICE O/P EST LOW 20 MIN: CPT

## 2025-05-13 PROCEDURE — 1036F TOBACCO NON-USER: CPT

## 2025-05-13 NOTE — PROGRESS NOTES
Chief Complaint   Patient presents with    Follow-up     LOV 5/25 F/U BBPPV DOING A LITTLE BETTER      Date of Evaluation: 5/13/2025   HPI  Ester Vogel is a 69 y.o. female follow-up for dizziness feeling about 90% better.  Did have neck x-ray ordered by primary care provider on 5/2/2025 showing moderate cervical degenerative changes greatest at C5-C7.  Reports continued neck discomfort and posterior headache.  1 or 2 seconds of dizziness with some position changes and rolling to left side but overall dizziness is much improved.   2/25  complaints of dizziness she did Epley last week 04/25/2025 by audiologist with some improvement but still not feeling normal.  Last week she was starting to get out of bed and became dizzy went to lie back and heard a loud popping sound her neck has been sore and stiff ever since.  This has been accompanied by nausea.  She reports a personal history of BPPV. Does have a personal history of tinnitus.  Denies otalgia or otorrhea.  Denies compression symptoms from thyroid enlargement.  February 12, 2025 ultrasound of thyroid by endocrinology.  Impression large heterogeneous thyroid gland demonstrating increased vascularity on color-flow imaging.  The thyroid gland has increased in size since the prior study.  The appearance would be consistent with diffused Hashimoto's thyroiditis.  There is a new solid hyperechoic 0.3 x 0.5 x 0.9 hide centimeter solid nodule within the right thyroid isthmus T RADS 3 mildly suspicious with no intervention needed at this time due to small size.  Her TSH is pending.  We reviewed today's audiogram that shows normal hearing sensitivity, Type AS tympanometry and excellent word recognition.      1/2024 Dr. Carey-history of Hashimoto's thyroiditis and multinodular goiter and bilateral tinnitus.  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  She has been aware of nighttime tinnitus.  Her audiogram today is normal.  No headache  "or dizziness.  Her last ultrasound was 2023 and she had a normal TSH in May 2023.  No changes in her neck.       Past Medical History:   Diagnosis Date    Abnormal levels of other serum enzymes 2015    Elevated liver enzymes    Abnormal Pap smear of cervix In chart    Cervicalgia 2014    Neck pain    Disease of thyroid gland 2022    nodule found on thyroid    HPV (human papilloma virus) infection In chart    Hypothyroidism In chart    Personal history of diseases of the blood and blood-forming organs and certain disorders involving the immune mechanism     History of autoimmune disorder    Personal history of other diseases of the circulatory system     History of hypertension    Personal history of other specified conditions 2015    History of fatigue      Past Surgical History:   Procedure Laterality Date    CERVICAL BIOPSY  W/ LOOP ELECTRODE EXCISION       SECTION, CLASSIC       SECTION, LOW TRANSVERSE  1991    OTHER SURGICAL HISTORY  2022    Laparoscopy          Medications:   Current Outpatient Medications   Medication Instructions    albuterol 90 mcg/actuation inhaler     calcium carbonate (Oscal) 500 mg calcium (1,250 mg) tablet 1 tablet with meals Orally Twice a day for 30 day(s)    cholecalciferol (VITAMIN D-3) 50,000 Units, oral, Once Weekly    fluticasone (Flonase) 50 mcg/actuation nasal spray 1 spray, Each Nostril, Daily, Shake gently. Before first use, prime pump. After use, clean tip and replace cap.    levothyroxine (SYNTHROID, LEVOXYL) 75 mcg, oral, Daily    meclizine (ANTIVERT) 25 mg, oral, 3 times daily PRN    meloxicam (MOBIC) 7.5 mg, oral, Daily    MULTIVITAMIN ORAL as directed Orally    propranolol (INDERAL) 20 mg, oral, 2 times daily        Allergies:  No Known Allergies     Physical Exam:  Last Recorded Vitals  Temperature 36.7 °C (98.1 °F), height 1.676 m (5' 6\"), weight 86.2 kg (190 lb).  []General appearance: Well-developed, " well-nourished in no acute distress, conversant with normal voice quality    Head/face: No erythema or edema or facial tenderness, and normal facial nerve function bilaterally    External ear: Clear external auditory canals with normal pinnae  Tube status: N/A  Middle ear: Tympanic membranes intact and mobile, middle ears normal.  Tympanic membrane perforation: N/A  Mastoid bowl: N/A  Hearing: Normal conversational awareness at normal speech thresholds    Nose visualized using: Anterior rhinoscopy  Nasal dorsum: Nontraumatic midline appearance  Septum: Midline, nonobstructing  Inferior turbinates: Normal, pink  Secretions: Dry    Oral cavity and oropharynx: Normal  Teeth: Good condition  Floor of mouth: without lesions  Palate: Normal hard palate, soft palate and uvula  Oropharynx: Clear, no lesions present  Buccal mucosa: Normal without masses or lesions  Lips: Normal    Nasopharynx: Inadequate mirror exam secondary to gag/anatomy    Neck:  Salivary glands: Normal bilateral parotid and submandibular glands by inspection and palpation.  Non-thyroid masses: No palpable masses or significant lymphadenopathy  Trachea: Midline  Thyroid: No thyromegaly or palpable nodules  Temporomandibular joint: Nontender  Cervical range of motion: Tender and stiff x-rays pending    Neurologic exam: Alert and oriented x3, appropriate affect.  Cranial nerves II-XII normal bilaterally  Extraocular movement: Extraocular movement intact, normal gaze alignment        Ester was seen today for follow-up.  Diagnoses and all orders for this visit:  Dizziness and giddiness (Primary)  Cervicalgia           PLAN  Ester is going out of town for a wedding this weekend so she would like to hold off on Epley's maneuver especially in light that she is feeling 90% better.  BPPV handout given with exercises.  I will order a VNG to be done here at [facility] and she is open to vestibular rehab if indicated could be useful for dizziness and neck  symptoms.  We will talk after her VNG.  She was diagnosed with a concussion in April I have asked her to follow-up with her primary care regarding continued headaches.

## 2025-05-15 ENCOUNTER — APPOINTMENT (OUTPATIENT)
Dept: PRIMARY CARE | Facility: CLINIC | Age: 70
End: 2025-05-15
Payer: MEDICARE

## 2025-05-15 VITALS
HEART RATE: 74 BPM | RESPIRATION RATE: 16 BRPM | SYSTOLIC BLOOD PRESSURE: 120 MMHG | DIASTOLIC BLOOD PRESSURE: 82 MMHG | BODY MASS INDEX: 30.99 KG/M2 | WEIGHT: 192 LBS | TEMPERATURE: 97.9 F

## 2025-05-15 DIAGNOSIS — J84.10 PULMONARY FIBROSIS, UNSPECIFIED (MULTI): ICD-10-CM

## 2025-05-15 DIAGNOSIS — E78.5 DYSLIPIDEMIA: ICD-10-CM

## 2025-05-15 DIAGNOSIS — R25.2 LEG CRAMPS: Primary | ICD-10-CM

## 2025-05-15 DIAGNOSIS — I10 ESSENTIAL (PRIMARY) HYPERTENSION: ICD-10-CM

## 2025-05-15 DIAGNOSIS — E55.9 VITAMIN D INSUFFICIENCY: ICD-10-CM

## 2025-05-15 DIAGNOSIS — E03.9 HYPOTHYROIDISM, UNSPECIFIED TYPE: ICD-10-CM

## 2025-05-15 PROCEDURE — 99214 OFFICE O/P EST MOD 30 MIN: CPT | Performed by: INTERNAL MEDICINE

## 2025-05-15 PROCEDURE — 1159F MED LIST DOCD IN RCRD: CPT | Performed by: INTERNAL MEDICINE

## 2025-05-15 PROCEDURE — 3074F SYST BP LT 130 MM HG: CPT | Performed by: INTERNAL MEDICINE

## 2025-05-15 PROCEDURE — 3079F DIAST BP 80-89 MM HG: CPT | Performed by: INTERNAL MEDICINE

## 2025-05-15 PROCEDURE — 1036F TOBACCO NON-USER: CPT | Performed by: INTERNAL MEDICINE

## 2025-05-15 PROCEDURE — 1125F AMNT PAIN NOTED PAIN PRSNT: CPT | Performed by: INTERNAL MEDICINE

## 2025-05-15 RX ORDER — LANOLIN ALCOHOL/MO/W.PET/CERES
400 CREAM (GRAM) TOPICAL DAILY
Qty: 30 TABLET | Refills: 11 | Status: SHIPPED | OUTPATIENT
Start: 2025-05-15 | End: 2026-05-15

## 2025-05-15 RX ORDER — PROPRANOLOL HYDROCHLORIDE 20 MG/1
20 TABLET ORAL 2 TIMES DAILY
Qty: 180 TABLET | Refills: 1 | Status: SHIPPED | OUTPATIENT
Start: 2025-05-15

## 2025-05-15 ASSESSMENT — ENCOUNTER SYMPTOMS
DEPRESSION: 1
OCCASIONAL FEELINGS OF UNSTEADINESS: 1
LOSS OF SENSATION IN FEET: 0

## 2025-05-15 ASSESSMENT — PAIN SCALES - GENERAL: PAINLEVEL_OUTOF10: 6

## 2025-06-06 ENCOUNTER — OFFICE VISIT (OUTPATIENT)
Dept: PRIMARY CARE | Facility: CLINIC | Age: 70
End: 2025-06-06
Payer: MEDICARE

## 2025-06-06 VITALS
HEIGHT: 66 IN | DIASTOLIC BLOOD PRESSURE: 80 MMHG | HEART RATE: 72 BPM | BODY MASS INDEX: 30.53 KG/M2 | TEMPERATURE: 97.7 F | SYSTOLIC BLOOD PRESSURE: 122 MMHG | WEIGHT: 190 LBS

## 2025-06-06 DIAGNOSIS — R00.2 HEART PALPITATIONS: Primary | ICD-10-CM

## 2025-06-06 DIAGNOSIS — M54.2 NECK PAIN: ICD-10-CM

## 2025-06-06 DIAGNOSIS — M54.2 CERVICALGIA: ICD-10-CM

## 2025-06-06 ASSESSMENT — ENCOUNTER SYMPTOMS
LOSS OF SENSATION IN FEET: 0
OCCASIONAL FEELINGS OF UNSTEADINESS: 0
DEPRESSION: 0

## 2025-06-06 ASSESSMENT — PAIN SCALES - GENERAL: PAINLEVEL_OUTOF10: 6

## 2025-06-06 NOTE — PROGRESS NOTES
Subjective   Ester Vogel is a 69 y.o. female who presents for follow-up neck pain preadmission  HPI  Ester 69 with a known history of hypothyroid hypertension dyslipidemia patient is here for follow-up, takes medication prescribed without side effects to report denies chest pain shortness of breath fever chill nausea vomiting constipation diarrhea dysuria urgency frequency.  Neck pain  severe  follows with Ortho now, Planning epidural under IV sedation.  She was management complains of a severe neck pain, especially with turning head from right to left.  Denies numbness or weakness of upper extremities  Review of Systems  10 system reviewed pertinent as above  Objective     Visit Vitals  /80   Pulse 72   Temp 36.5 °C (97.7 °F)          Physical Exam    HEENT: Atraumatic normocephalic the pupils are equal and round and reactive to light the sclerae nonicteric extraocular motion are intact.  Neck: Is supple without JVD no carotid bruits the trachea is midline there are no masses pulses are equal and bilateral with normal upstroke.  Skin: Normal.  Skin good texture.  Moist.  Good turgor.  No lesions, no rashes.  Lymph: No lymphadenopathy appreciated, no masses, no lesions  Lungs: Are clear to auscultation and percussion, good breath sounds bilaterally, no rhonchi, no wheezing, good diaphragmatic excursion.  Heart: Normal rate and normal rhythm S1, S2, no S3, no gallop, murmur or rub.  Abdomen: Soft, nontender, no organomegaly, good bowel sounds.    Extremities: Full range of motion, good pulses bilateral.  No cyanosis, no clubbing or edema.  Neuro: Cranial nerves II-XII are grossly intact there is no sensory or motor deficits.  Able to move all extremities.    Assessment/Plan     Acute cervicalgia  In the setting of moderate cervical degenerative changes C5-C7  Continues to complain of pain nonrelenting  Ester is here for pre- surgical clearance  Planning epidural injection under sedation  Hopefully  to follow-up with physical therapy     Blood works  Done and reviewed May 2, 2025  Essentially normal    EKG normal sinus rhythm normal EKG    Patient is medically cleared for surgery  Epidural injection under sedation    Reviewed Blood work with patient    For follow-up fasting blood work  Done 10/2024 at [place]  We reviewed essentially normal  Takes her medication as prescribed.   Negative CT Head    Paraspinal muscle pain  Due to concussion  Xray C Spine  Medrol dose pac, Antivert    Mammogram 02/2024 yearly    Follows dermatology Dr ruiz    Colonoscopy Dr Gumaro Bates 05/17/2024 repeat in 5 years    Immunization 2/2 1 booster  Flu Vaccine 2022 need 2024  Shingrix 2/2    Follows with Gyn Dr Carey Retired  Cont to follow regularly    Continue with the low-fat, low-cholesterol diet,  I recommended Mediterranean diet, which include fish, chicken, vegetables and olive oil  Exercise daily for 30 minutes at least 3 times a week  Continue home medications    Hypertension well controlled  No added salt diet, do not and salt to your food  Try to exercise every other day for 30 minutes  Continue current medications    Hypothyroidism  Continue current medications  Report any changes such as weight gain or weight loss  Continue to exercise regularly    Elevated BMI  Low-fat, low-cholesterol diet, exercise, daily  Ideal BMI is between 23 and 26 kg/m²  Low carbohydrate diet.    obesity  Problem List Items Addressed This Visit       Heart palpitations - Primary    Relevant Orders    ECG 12 lead (Clinic Performed)                     Tan Manzo MD

## 2025-06-17 ENCOUNTER — NURSE TRIAGE (OUTPATIENT)
Dept: AUDIOLOGY | Facility: CLINIC | Age: 70
End: 2025-06-17
Payer: MEDICARE

## 2025-06-17 NOTE — TELEPHONE ENCOUNTER
[facility]iate Call notes copied by Casie Tobin on Tuesday June 17, 2025  5:51 PM  ------  Documentation by Casie Tobin. [1923] 6/17/2025  5:07 PM  Dr. Carey/ [facility]    Had Eply done and MRI and ct scan and per patient she was cleared as safe for Eply; would like to come in for Eply before she travels in mid July.

## 2025-06-30 ENCOUNTER — APPOINTMENT (OUTPATIENT)
Dept: OBSTETRICS AND GYNECOLOGY | Facility: CLINIC | Age: 70
End: 2025-06-30
Payer: MEDICARE

## 2025-06-30 ENCOUNTER — OFFICE VISIT (OUTPATIENT)
Dept: OBSTETRICS AND GYNECOLOGY | Facility: CLINIC | Age: 70
End: 2025-06-30
Payer: MEDICARE

## 2025-06-30 VITALS — DIASTOLIC BLOOD PRESSURE: 68 MMHG | BODY MASS INDEX: 30.54 KG/M2 | WEIGHT: 189.2 LBS | SYSTOLIC BLOOD PRESSURE: 120 MMHG

## 2025-06-30 DIAGNOSIS — Z13.820 SCREENING FOR OSTEOPOROSIS: ICD-10-CM

## 2025-06-30 DIAGNOSIS — M85.89 OTHER SPECIFIED DISORDERS OF BONE DENSITY AND STRUCTURE, MULTIPLE SITES: ICD-10-CM

## 2025-06-30 DIAGNOSIS — Z01.419 ENCOUNTER FOR ANNUAL ROUTINE GYNECOLOGICAL EXAMINATION: Primary | ICD-10-CM

## 2025-06-30 PROCEDURE — 1036F TOBACCO NON-USER: CPT | Performed by: STUDENT IN AN ORGANIZED HEALTH CARE EDUCATION/TRAINING PROGRAM

## 2025-06-30 PROCEDURE — 1126F AMNT PAIN NOTED NONE PRSNT: CPT | Performed by: STUDENT IN AN ORGANIZED HEALTH CARE EDUCATION/TRAINING PROGRAM

## 2025-06-30 PROCEDURE — 99397 PER PM REEVAL EST PAT 65+ YR: CPT | Performed by: STUDENT IN AN ORGANIZED HEALTH CARE EDUCATION/TRAINING PROGRAM

## 2025-06-30 PROCEDURE — 1159F MED LIST DOCD IN RCRD: CPT | Performed by: STUDENT IN AN ORGANIZED HEALTH CARE EDUCATION/TRAINING PROGRAM

## 2025-06-30 PROCEDURE — 1160F RVW MEDS BY RX/DR IN RCRD: CPT | Performed by: STUDENT IN AN ORGANIZED HEALTH CARE EDUCATION/TRAINING PROGRAM

## 2025-06-30 PROCEDURE — 3078F DIAST BP <80 MM HG: CPT | Performed by: STUDENT IN AN ORGANIZED HEALTH CARE EDUCATION/TRAINING PROGRAM

## 2025-06-30 PROCEDURE — 3074F SYST BP LT 130 MM HG: CPT | Performed by: STUDENT IN AN ORGANIZED HEALTH CARE EDUCATION/TRAINING PROGRAM

## 2025-06-30 ASSESSMENT — PATIENT HEALTH QUESTIONNAIRE - PHQ9
SUM OF ALL RESPONSES TO PHQ9 QUESTIONS 1 & 2: 0
2. FEELING DOWN, DEPRESSED OR HOPELESS: NOT AT ALL
1. LITTLE INTEREST OR PLEASURE IN DOING THINGS: NOT AT ALL

## 2025-06-30 ASSESSMENT — PAIN SCALES - GENERAL: PAINLEVEL_OUTOF10: 0-NO PAIN

## 2025-06-30 ASSESSMENT — ENCOUNTER SYMPTOMS
LOSS OF SENSATION IN FEET: 0
DEPRESSION: 0
OCCASIONAL FEELINGS OF UNSTEADINESS: 0

## 2025-06-30 NOTE — PROGRESS NOTES
ANNUAL GYNECOLOGIC VISIT  Subjective   HPI:  Ester Vogel is a 69 y.o. female  No LMP recorded. Patient is postmenopausal. here for annual exam.     Complaints:   None  Periods: Menopausal since   Abnormal bleeding: Denies  Pain:    Denies    GYN History:   HRT: No  History of abnormal Pap smear:  Yes, follow up testing and colposcopy normal  History of abnormal mammogram:   Has fibrocystic breasts, follows with breast surgery, has imaging every year and exams every 6 months  Family history: Denies family history of breast, colon, vulvar, vaginal, cervical, uterine, or ovarian cancer. (Is unsure about details as family is in [facility] and had limited health care)    Last Pap Smear:  Result Date Procedure Results Follow-ups   2023 CONVERTED GYN CYTOLOGY Pap Smear: NILM      Last Mammogram:  3/13/25  IMPRESSION:   There is no mammographic evidence of malignancy in either breast.   Routine screening mammogram is recommended. Annual mammogram will be due   in 1 year.   BI-RADS Category 1: Negative     Last DEXA:  23   DEXA BONE DENSITY AXIAL SKELETON W VFA  - Impression -  Lumbar spine evaluation demonstrates normal mineralization.  The overall hip evaluation demonstrates normal mineralization.  The VFA images demonstrate no obvious fracture.    Last Colon Cancer Screening:   Colonoscopy 26- repeat 5 years    OB History          1    Para   1    Term   1       0    AB   0    Living   1         SAB   0    IAB   0    Ectopic   0    Multiple   0    Live Births   1                Medical History[1]    Surgical History[2]    Current Medications[3]     Social History     Tobacco Use    Smoking status: Former     Current packs/day: 0.00     Average packs/day: 1 pack/day for 29.1 years (29.1 ttl pk-yrs)     Types: Cigarettes     Start date:      Quit date: 2004     Years since quittin.4     Passive exposure: Never    Smokeless tobacco: Never   Substance Use Topics    Alcohol  use: Yes     Alcohol/week: 2.0 standard drinks of alcohol     Types: 2 Glasses of wine per week     Objective   /68   Wt 85.8 kg (189 lb 3.2 oz)   BMI 30.54 kg/m²     Physical Exam:  General: Alert and oriented, in no acute distress.  Psych: Normal affect and mood. Able to answer questions appropriately.   Breast/Axilla: Normal to palpation bilaterally without masses, skin changes, or nipple discharge. No palpable supraclavicular or axillary lymphadenopathy.   Heart: Regular rate.  Lungs: Non labored respirations.  Abdomen: Soft, non-tender, without masses or organomegaly.  GYN:  Speculum:  Vulva: Normal architecture without erythema, masses, or lesions.   Vagina: Atrophic mucosa without lesions or masses. No abnormal vaginal discharge.   Cervix: Normal without erythema, masses, lesions, or signs of cervicitis.  Bimanual:   Cervix: No cervical motion tenderness.  Uterus: Normal, mobile, non-enlarged, non tender uterus.  Adnexa: Normal without tenderness, nodularity, masses, or lesions.    Assessment/Plan     69 y.o.  , here for annual GYN examination   Assessment & Plan  Encounter for annual routine gynecological examination  -Routine annual  -Discussed OB/GYN Preventive measures:     -Pap smear indicated every 3-5 years if normal and otherwise low risk   - Self breast exam as needed and clinical breast examination/mammogram yearly   - Screening colonoscopy recommended starting at age 45, then Q3-10 years depending on testing and family history   - Osteoporosis prevention discussion included vit D3/calcium supplements, weight-bearing exercise, DEXA starting at age 65 or sooner if risk factors  - Genitourinary skin hygiene discussed  - Diet/Weight management discussed  - Encouraged moderate level intensity exercise 30-60 minutes 3-5 times/week  - Patient to return in 1 year for annual GYN visit, sooner as clinically indicated        Screening for osteoporosis    Orders:    XR DEXA bone density;  Future      Melyssa Mcneal MD           [1]   Past Medical History:  Diagnosis Date    Cervicalgia 2014    Neck pain    Disease of thyroid gland 2022    nodule found on thyroid    Hypothyroidism In chart   [2]   Past Surgical History:  Procedure Laterality Date    CERVICAL BIOPSY  W/ LOOP ELECTRODE EXCISION       SECTION, LOW TRANSVERSE  1991    LAPAROSCOPY DIAGNOSTIC / BIOPSY / ASPIRATION / LYSIS  2022    Diagnosic laparoscopy for ovarian pain; normal   [3]   Current Outpatient Medications:     albuterol 90 mcg/actuation inhaler, , Disp: , Rfl:     calcium carbonate (Oscal) 500 mg calcium (1,250 mg) tablet, 1 tablet with meals Orally Twice a day for 30 day(s), Disp: , Rfl:     cholecalciferol (Vitamin D-3) 50,000 unit capsule, TAKE 1 CAPSULE BY MOUTH EVERY WEEK, Disp: 12 capsule, Rfl: 1    fluticasone (Flonase) 50 mcg/actuation nasal spray, Administer 1 spray into each nostril once daily. Shake gently. Before first use, prime pump. After use, clean tip and replace cap., Disp: 16 g, Rfl: 5    levothyroxine (Synthroid, Levoxyl) 75 mcg tablet, Take 1 tablet (75 mcg) by mouth once daily., Disp: 90 tablet, Rfl: 3    magnesium oxide (Mag-Ox) 400 mg (241.3 mg elemental) tablet, Take 1 tablet by mouth once daily., Disp: 30 tablet, Rfl: 11    MULTIVITAMIN ORAL, as directed Orally, Disp: , Rfl:     propranolol (Inderal) 20 mg tablet, Take 1 tablet (20 mg) by mouth 2 times a day. (Patient not taking: Reported on 2025), Disp: 180 tablet, Rfl: 1

## 2025-07-07 ENCOUNTER — APPOINTMENT (OUTPATIENT)
Dept: AUDIOLOGY | Facility: CLINIC | Age: 70
End: 2025-07-07
Payer: MEDICARE

## 2025-07-07 DIAGNOSIS — R42 DIZZINESS: Primary | ICD-10-CM

## 2025-07-07 PROCEDURE — 92542 POSITIONAL NYSTAGMUS TEST: CPT | Performed by: AUDIOLOGIST

## 2025-07-10 NOTE — PROGRESS NOTES
EVALUATION OF BENIGN POSITIONAL PAROXYSMAL VERTIGO BPPV    HISTORY:  Patient has history of dizziness rolling left.  She has history of 2 bulging discs in neck; she reports that physical therapy has resulted mostly resolved neck pain.   She reports she is 90 % better after repositioning of left sided BPPV and physical therapy for neck related issues.  She was referred for Videonystagmography by ANSHU Mcfadden on 5/13/25 and is scheduled for this test on 8/11/25.   Patient reports she has concerns about the Videonystagmography evaluation.    She also reports that her physical therapist has recommended that she have balance therapy at [facility] and is requesting a referral.       EVALUATION: Performed with Videonystagmography goggles for recording    Spontaneous Nystagmus:  negative for any nystagmus    Hallpike head right and left were both negative for nystagmus/dizziness today.      RECOMMENDATIONS:   1) Discussed Videonystagmography/vHIT/VEMP testing as part of balance function test in detail.           Patient was originally going to cancel testing; she will now consider keeping appointment with better understanding of what is involved in assessment and purpose of each section.  Verified that she has instructions.   2) Patient will need to have her physical therapist directly contact ANSHU Mcfadden for request of referral to [facility].     *discussed the value of having balance function test results to help guide therapy.      Carmencita Hardwick M.A., JONAHTAN/A

## 2025-07-11 ENCOUNTER — HOSPITAL ENCOUNTER (OUTPATIENT)
Dept: RADIOLOGY | Facility: HOSPITAL | Age: 70
End: 2025-07-11
Payer: MEDICARE

## 2025-07-11 DIAGNOSIS — R42 DIZZINESS: Primary | ICD-10-CM

## 2025-07-18 ENCOUNTER — APPOINTMENT (OUTPATIENT)
Dept: RADIOLOGY | Facility: HOSPITAL | Age: 70
End: 2025-07-18
Payer: MEDICARE

## 2025-07-18 DIAGNOSIS — Z13.820 SCREENING FOR OSTEOPOROSIS: ICD-10-CM

## 2025-07-18 DIAGNOSIS — M85.89 OTHER SPECIFIED DISORDERS OF BONE DENSITY AND STRUCTURE, MULTIPLE SITES: ICD-10-CM

## 2025-07-18 PROCEDURE — 77080 DXA BONE DENSITY AXIAL: CPT

## 2025-08-04 ENCOUNTER — TELEPHONE (OUTPATIENT)
Dept: OBSTETRICS AND GYNECOLOGY | Facility: CLINIC | Age: 70
End: 2025-08-04
Payer: MEDICARE

## 2025-08-11 ENCOUNTER — APPOINTMENT (OUTPATIENT)
Dept: AUDIOLOGY | Facility: CLINIC | Age: 70
End: 2025-08-11
Payer: MEDICARE

## 2025-08-18 ENCOUNTER — APPOINTMENT (OUTPATIENT)
Dept: OTOLARYNGOLOGY | Facility: CLINIC | Age: 70
End: 2025-08-18
Payer: MEDICARE

## 2025-10-09 ENCOUNTER — APPOINTMENT (OUTPATIENT)
Dept: PRIMARY CARE | Facility: CLINIC | Age: 70
End: 2025-10-09
Payer: MEDICARE

## 2025-12-01 ENCOUNTER — APPOINTMENT (OUTPATIENT)
Dept: NEUROLOGY | Facility: CLINIC | Age: 70
End: 2025-12-01
Payer: MEDICARE